# Patient Record
Sex: FEMALE | Race: WHITE | Employment: FULL TIME | URBAN - METROPOLITAN AREA
[De-identification: names, ages, dates, MRNs, and addresses within clinical notes are randomized per-mention and may not be internally consistent; named-entity substitution may affect disease eponyms.]

---

## 2017-01-23 ENCOUNTER — HOSPITAL ENCOUNTER (EMERGENCY)
Age: 22
Discharge: HOME | End: 2017-01-23
Attending: EMERGENCY MEDICINE
Payer: COMMERCIAL

## 2017-01-23 VITALS
TEMPERATURE: 98.1 F | RESPIRATION RATE: 16 BRPM | WEIGHT: 195 LBS | BODY MASS INDEX: 35.88 KG/M2 | OXYGEN SATURATION: 100 % | HEIGHT: 62 IN | SYSTOLIC BLOOD PRESSURE: 117 MMHG | DIASTOLIC BLOOD PRESSURE: 72 MMHG | HEART RATE: 100 BPM

## 2017-01-23 DIAGNOSIS — R11.2 NON-INTRACTABLE VOMITING WITH NAUSEA, UNSPECIFIED VOMITING TYPE: ICD-10-CM

## 2017-01-23 DIAGNOSIS — R10.9 CHRONIC ABDOMINAL PAIN: Primary | ICD-10-CM

## 2017-01-23 DIAGNOSIS — G89.29 CHRONIC ABDOMINAL PAIN: Primary | ICD-10-CM

## 2017-01-23 LAB
ALBUMIN SERPL BCP-MCNC: 4 G/DL (ref 3.4–5)
ALBUMIN/GLOB SERPL: 1.5 {RATIO} (ref 0.8–1.7)
ALP SERPL-CCNC: 82 U/L (ref 45–117)
ALT SERPL-CCNC: 17 U/L (ref 13–56)
ANION GAP BLD CALC-SCNC: 12 MMOL/L (ref 3–18)
APPEARANCE UR: CLEAR
AST SERPL W P-5'-P-CCNC: 11 U/L (ref 15–37)
BACTERIA URNS QL MICRO: ABNORMAL /HPF
BASOPHILS # BLD AUTO: 0 K/UL (ref 0–0.06)
BASOPHILS # BLD: 0 % (ref 0–2)
BILIRUB SERPL-MCNC: 0.4 MG/DL (ref 0.2–1)
BILIRUB UR QL: NEGATIVE
BUN SERPL-MCNC: 9 MG/DL (ref 7–18)
BUN/CREAT SERPL: 10 (ref 12–20)
CALCIUM SERPL-MCNC: 8.4 MG/DL (ref 8.5–10.1)
CHLORIDE SERPL-SCNC: 106 MMOL/L (ref 100–108)
CO2 SERPL-SCNC: 23 MMOL/L (ref 21–32)
COLOR UR: ABNORMAL
CREAT SERPL-MCNC: 0.9 MG/DL (ref 0.6–1.3)
DIFFERENTIAL METHOD BLD: NORMAL
EOSINOPHIL # BLD: 0.1 K/UL (ref 0–0.4)
EOSINOPHIL NFR BLD: 1 % (ref 0–5)
EPITH CASTS URNS QL MICRO: ABNORMAL /LPF (ref 0–5)
ERYTHROCYTE [DISTWIDTH] IN BLOOD BY AUTOMATED COUNT: 13.9 % (ref 11.6–14.5)
GLOBULIN SER CALC-MCNC: 2.7 G/DL (ref 2–4)
GLUCOSE SERPL-MCNC: 122 MG/DL (ref 74–99)
GLUCOSE UR STRIP.AUTO-MCNC: NEGATIVE MG/DL
HCG UR QL: NEGATIVE
HCT VFR BLD AUTO: 39.3 % (ref 35–45)
HGB BLD-MCNC: 12.8 G/DL (ref 12–16)
HGB UR QL STRIP: NEGATIVE
KETONES UR QL STRIP.AUTO: ABNORMAL MG/DL
LEUKOCYTE ESTERASE UR QL STRIP.AUTO: ABNORMAL
LIPASE SERPL-CCNC: 189 U/L (ref 73–393)
LYMPHOCYTES # BLD AUTO: 25 % (ref 21–52)
LYMPHOCYTES # BLD: 2.2 K/UL (ref 0.9–3.6)
MCH RBC QN AUTO: 29.4 PG (ref 24–34)
MCHC RBC AUTO-ENTMCNC: 32.6 G/DL (ref 31–37)
MCV RBC AUTO: 90.3 FL (ref 74–97)
MONOCYTES # BLD: 0.5 K/UL (ref 0.05–1.2)
MONOCYTES NFR BLD AUTO: 6 % (ref 3–10)
NEUTS SEG # BLD: 5.9 K/UL (ref 1.8–8)
NEUTS SEG NFR BLD AUTO: 68 % (ref 40–73)
NITRITE UR QL STRIP.AUTO: NEGATIVE
PH UR STRIP: 5.5 [PH] (ref 5–8)
PLATELET # BLD AUTO: 250 K/UL (ref 135–420)
PMV BLD AUTO: 10.3 FL (ref 9.2–11.8)
POTASSIUM SERPL-SCNC: 3.8 MMOL/L (ref 3.5–5.5)
PROT SERPL-MCNC: 6.7 G/DL (ref 6.4–8.2)
PROT UR STRIP-MCNC: 30 MG/DL
RBC # BLD AUTO: 4.35 M/UL (ref 4.2–5.3)
RBC #/AREA URNS HPF: ABNORMAL /HPF (ref 0–5)
SODIUM SERPL-SCNC: 141 MMOL/L (ref 136–145)
SP GR UR REFRACTOMETRY: >1.03 (ref 1–1.03)
UROBILINOGEN UR QL STRIP.AUTO: 1 EU/DL (ref 0.2–1)
WBC # BLD AUTO: 8.6 K/UL (ref 4.6–13.2)
WBC URNS QL MICRO: ABNORMAL /HPF (ref 0–4)

## 2017-01-23 PROCEDURE — 96375 TX/PRO/DX INJ NEW DRUG ADDON: CPT

## 2017-01-23 PROCEDURE — 96361 HYDRATE IV INFUSION ADD-ON: CPT

## 2017-01-23 PROCEDURE — 74011250636 HC RX REV CODE- 250/636: Performed by: PHYSICIAN ASSISTANT

## 2017-01-23 PROCEDURE — 99282 EMERGENCY DEPT VISIT SF MDM: CPT

## 2017-01-23 PROCEDURE — 80053 COMPREHEN METABOLIC PANEL: CPT

## 2017-01-23 PROCEDURE — 96374 THER/PROPH/DIAG INJ IV PUSH: CPT

## 2017-01-23 PROCEDURE — 81025 URINE PREGNANCY TEST: CPT

## 2017-01-23 PROCEDURE — 85025 COMPLETE CBC W/AUTO DIFF WBC: CPT

## 2017-01-23 PROCEDURE — 83690 ASSAY OF LIPASE: CPT

## 2017-01-23 PROCEDURE — 81001 URINALYSIS AUTO W/SCOPE: CPT

## 2017-01-23 PROCEDURE — 96376 TX/PRO/DX INJ SAME DRUG ADON: CPT

## 2017-01-23 RX ORDER — MORPHINE SULFATE 2 MG/ML
2 INJECTION, SOLUTION INTRAMUSCULAR; INTRAVENOUS ONCE
Status: COMPLETED | OUTPATIENT
Start: 2017-01-23 | End: 2017-01-23

## 2017-01-23 RX ORDER — ONDANSETRON 2 MG/ML
4 INJECTION INTRAMUSCULAR; INTRAVENOUS
Status: COMPLETED | OUTPATIENT
Start: 2017-01-23 | End: 2017-01-23

## 2017-01-23 RX ORDER — DESVENLAFAXINE SUCCINATE 50 MG/1
50 TABLET, EXTENDED RELEASE ORAL
COMMUNITY
End: 2017-05-02

## 2017-01-23 RX ORDER — TRAMADOL HYDROCHLORIDE 50 MG/1
50 TABLET ORAL
Qty: 12 TAB | Refills: 0 | Status: SHIPPED | OUTPATIENT
Start: 2017-01-23 | End: 2017-05-02

## 2017-01-23 RX ADMIN — ONDANSETRON 4 MG: 2 INJECTION INTRAMUSCULAR; INTRAVENOUS at 16:30

## 2017-01-23 RX ADMIN — Medication 2 MG: at 17:27

## 2017-01-23 RX ADMIN — SODIUM CHLORIDE 1000 ML: 900 INJECTION, SOLUTION INTRAVENOUS at 16:27

## 2017-01-23 RX ADMIN — Medication 2 MG: at 16:32

## 2017-01-23 NOTE — ED NOTES
I performed a brief evaluation, including history and physical, of the patient here in triage and I have determined that pt will need further treatment and evaluation from the main side ER physician. I have placed initial orders to help in expediting patients care.      January 23, 2017 at 3:56 PM - Kaitlin Martino PA-C        Visit Vitals    /76 (BP 1 Location: Left arm, BP Patient Position: At rest)    Pulse (!) 119    Temp 98.1 °F (36.7 °C)    Resp 18    Ht 5' 2\" (1.575 m)    Wt 88.5 kg (195 lb)    SpO2 98%    BMI 35.67 kg/m2

## 2017-01-23 NOTE — ED NOTES
Patient states that pain is 8/10. Also c/o nausea, no emesis while in [facility]TA. IV fluids up , medicated as ordered.

## 2017-01-23 NOTE — ED PROVIDER NOTES
[facility]ERGENCY DEPT      22 y.o. female with a PMH of Depression and Chronic abdominal pain presents to the ED c/o diffuse upper abdominal pain since last night. She notes this feels like her typical pain. She vomited x 3 last night, has not vomited yet today. Reports being diagnosed with \"Functional\" disorder of her abdomen by her GI doctor. She is s/p cholecystectomy and appendectomy in the distant past.  She denies any fever, chills, diarrhea, constipation, vaginal discharge/bleeding, urinary symptoms, or other complaints as this time. No current facility-administered medications for this encounter. Current Outpatient Prescriptions   Medication Sig    Desvenlafaxine SR (PRISTIQ) 50 mg tablet Take 50 mg by mouth.  traMADol (ULTRAM) 50 mg tablet Take 1 Tab by mouth every six (6) hours as needed for Pain. Max Daily Amount: 200 mg.    ARIPiprazole (ABILIFY) 5 mg tablet Take 5 mg by mouth daily.  albuterol (PROVENTIL HFA, VENTOLIN HFA, PROAIR HFA) 90 mcg/actuation inhaler Take 2 Puffs by inhalation every four (4) hours as needed for Wheezing or Shortness of Breath (or cough).  ibuprofen (MOTRIN) 800 mg tablet     MEDROXYPROGESTERONE ACETATE (DEPO-PROVERA IM) by IntraMUSCular route. Past Medical History   Diagnosis Date    ADHD (attention deficit hyperactivity disorder)     Asthma     Depression     Migraine     Sinus infection        Past Surgical History   Procedure Laterality Date    Hx heent       ear tubes bilaterally    Hx cholecystectomy      Pr appendectomy         Family History   Problem Relation Age of Onset    Diabetes Maternal Aunt     Hypertension Maternal Grandmother     Diabetes Maternal Grandmother     Hypertension Maternal Grandfather        Social History     Social History    Marital status: SINGLE     Spouse name: N/A    Number of children: N/A    Years of education: N/A     Occupational History    Not on file.      Social History Main Topics    Smoking status: Never Smoker    Smokeless tobacco: Never Used    Alcohol use 0.6 oz/week     1 Cans of beer per week    Drug use: No    Sexual activity: Not Currently     Partners: Male     Birth control/ protection: Condom, Implant     Other Topics Concern    Not on file     Social History Narrative       Allergies   Allergen Reactions    Amoxicillin Rash    Pineapple Unknown (comments)     Facial numbness, swelling       Patient's primary care provider (as noted in EPIC):  Kaity Salinas MD    REVIEW OF SYSTEMS:    Constitutional:  Negative for fever, diaphoresis. HENT:  Negative for congestion. Respiratory:  Negative for cough and shortness of breath. Cardiovascular:  Negative for chest pain and palpitations. Gastrointestinal:+ diffuse upper abdominal pain, nausea/vomiting. Negative for constipation, diarrhea. Genitourinary:  Negative for flank pain. Musculoskeletal:  Negative for back pain. Skin:  Negative for pallor. Neurological:  Negative for weakness. All other systems negative as reviewed. Visit Vitals    /72 (BP 1 Location: Right arm, BP Patient Position: At rest;Sitting)    Pulse 100    Temp 98.1 °F (36.7 °C)    Resp 16    Ht 5' 2\" (1.575 m)    Wt 88.5 kg (195 lb)    SpO2 100%    BMI 35.67 kg/m2       Patient Vitals for the past 12 hrs:   Temp Pulse Resp BP SpO2   01/23/17 1740 - 100 16 117/72 100 %   01/23/17 1553 98.1 °F (36.7 °C) (!) 119 18 130/76 98 %       PHYSICAL EXAM:    CONSTITUTIONAL:  Alert, in no apparent distress;  well developed;  well nourished. HEAD:  Normocephalic, atraumatic. EYES:  EOMI. Non-icteric sclera. Normal conjunctiva. ENTM:  Mouth: mucous membranes moist.  NECK:  Supple  RESPIRATORY:  Chest clear, equal breath sounds, good air movement. CARDIOVASCULAR:  Regular rate and rhythm. No murmurs, rubs, or gallops. GI:  Normal bowel sounds, abdomen soft with diffuse mild TTP across her epigastrium.   No rebound or guarding. BACK:  Non-tender. NEURO:  Moves all four extremities, and grossly normal motor exam.  SKIN:  No rashes;  Normal for age. PSYCH:  Alert and normal affect. DIFFERENTIAL DIAGNOSES/ MEDICAL DECISION MAKING:  Gastritis, gerd, peptic ulcer disease, pancreatitis, gastroenteritis, hepatitis, constipation related pain, diverticulitis, urinary tract infection, obstruction, abdominal wall pain, versus combination of the above and/or numerous other processes/ etiologies.     Abnormal lab results from this emergency department encounter:  Labs Reviewed   URINALYSIS W/ RFLX MICROSCOPIC - Abnormal; Notable for the following:        Result Value    Specific gravity >1.030 (*)     Protein 30 (*)     Ketone TRACE (*)     Leukocyte Esterase TRACE (*)     All other components within normal limits   METABOLIC PANEL, COMPREHENSIVE - Abnormal; Notable for the following:     Glucose 122 (*)     BUN/Creatinine ratio 10 (*)     Calcium 8.4 (*)     AST 11 (*)     All other components within normal limits   URINE MICROSCOPIC ONLY - Abnormal; Notable for the following:     Bacteria 2+ (*)     All other components within normal limits   HCG URINE, QL   LIPASE   CBC WITH AUTOMATED DIFF       Lab values for this patient within approximately the last 12 hours:  Recent Results (from the past 12 hour(s))   LIPASE    Collection Time: 01/23/17  4:15 PM   Result Value Ref Range    Lipase 189 73 - 851 U/L   METABOLIC PANEL, COMPREHENSIVE    Collection Time: 01/23/17  4:15 PM   Result Value Ref Range    Sodium 141 136 - 145 mmol/L    Potassium 3.8 3.5 - 5.5 mmol/L    Chloride 106 100 - 108 mmol/L    CO2 23 21 - 32 mmol/L    Anion gap 12 3.0 - 18 mmol/L    Glucose 122 (H) 74 - 99 mg/dL    BUN 9 7.0 - 18 MG/DL    Creatinine 0.90 0.6 - 1.3 MG/DL    BUN/Creatinine ratio 10 (L) 12 - 20      GFR est AA >60 >60 ml/min/1.73m2    GFR est non-AA >60 >60 ml/min/1.73m2    Calcium 8.4 (L) 8.5 - 10.1 MG/DL    Bilirubin, total 0.4 0.2 - 1.0 MG/DL    ALT 17 13 - 56 U/L    AST 11 (L) 15 - 37 U/L    Alk. phosphatase 82 45 - 117 U/L    Protein, total 6.7 6.4 - 8.2 g/dL    Albumin 4.0 3.4 - 5.0 g/dL    Globulin 2.7 2.0 - 4.0 g/dL    A-G Ratio 1.5 0.8 - 1.7     CBC WITH AUTOMATED DIFF    Collection Time: 01/23/17  4:15 PM   Result Value Ref Range    WBC 8.6 4.6 - 13.2 K/uL    RBC 4.35 4.20 - 5.30 M/uL    HGB 12.8 12.0 - 16.0 g/dL    HCT 39.3 35.0 - 45.0 %    MCV 90.3 74.0 - 97.0 FL    MCH 29.4 24.0 - 34.0 PG    MCHC 32.6 31.0 - 37.0 g/dL    RDW 13.9 11.6 - 14.5 %    PLATELET 155 092 - 265 K/uL    MPV 10.3 9.2 - 11.8 FL    NEUTROPHILS 68 40 - 73 %    LYMPHOCYTES 25 21 - 52 %    MONOCYTES 6 3 - 10 %    EOSINOPHILS 1 0 - 5 %    BASOPHILS 0 0 - 2 %    ABS. NEUTROPHILS 5.9 1.8 - 8.0 K/UL    ABS. LYMPHOCYTES 2.2 0.9 - 3.6 K/UL    ABS. MONOCYTES 0.5 0.05 - 1.2 K/UL    ABS. EOSINOPHILS 0.1 0.0 - 0.4 K/UL    ABS.  BASOPHILS 0.0 0.0 - 0.06 K/UL    DF AUTOMATED     URINALYSIS W/ RFLX MICROSCOPIC    Collection Time: 01/23/17  4:25 PM   Result Value Ref Range    Color DARK YELLOW      Appearance CLEAR      Specific gravity >1.030 (H) 1.005 - 1.030    pH (UA) 5.5 5.0 - 8.0      Protein 30 (A) NEG mg/dL    Glucose NEGATIVE  NEG mg/dL    Ketone TRACE (A) NEG mg/dL    Bilirubin NEGATIVE  NEG      Blood NEGATIVE  NEG      Urobilinogen 1.0 0.2 - 1.0 EU/dL    Nitrites NEGATIVE  NEG      Leukocyte Esterase TRACE (A) NEG     HCG URINE, QL    Collection Time: 01/23/17  4:25 PM   Result Value Ref Range    HCG urine, Ql. NEGATIVE  NEG     URINE MICROSCOPIC ONLY    Collection Time: 01/23/17  4:25 PM   Result Value Ref Range    WBC 0 to 3 0 - 4 /hpf    RBC NONE 0 - 5 /hpf    Epithelial cells 2+ 0 - 5 /lpf    Bacteria 2+ (A) NEG /hpf       Medication(s) ordered for patient during this emergency visit encounter:  Medications   sodium chloride 0.9 % bolus infusion 1,000 mL (0 mL IntraVENous IV Completed 1/23/17 0979)   ondansetron (ZOFRAN) injection 4 mg (4 mg IntraVENous Given 1/23/17 1630) morphine injection 2 mg (2 mg IntraVENous Given 1/23/17 1632)   morphine injection 2 mg (2 mg IntraVENous Given 1/23/17 1727)       IMPRESSION AND MEDICAL DECISION MAKING:  Based upon the patient's presentation with noted HPI and PE, along with the work   up done in the emergency department, I believe that the patient is having abdominal pain likely from her chronic pain. She notes having a \"functional\" gut disorder. Mom states patient just needs pain control and that this is her normal abdominal pain. Pt had percocet at home from her GI doctor but ran out of it. However, I do believe that the patient is stable and can be discharged home with further outpatient evaluation of the abdominal pain by the patient's Gi doctor. Pt looks well. Labs essentially normal.  Vitals improved. Pt given fluids as well. Diagnosis:   1. Chronic abdominal pain    2. Non-intractable vomiting with nausea, unspecified vomiting type      Disposition: Home    Follow-up Information     Follow up With Details Comments [address], [place] In 3 days  [address] [facility]  [phone]      [facility] EMERGENCY DEPT  If symptoms worsen [address] [facility]  [phone]          Discharge Medication List as of 1/23/2017  5:02 PM      START taking these medications    Details   traMADol (ULTRAM) 50 mg tablet Take 1 Tab by mouth every six (6) hours as needed for Pain. Max Daily Amount: 200 mg., Print, Disp-12 Tab, R-0         CONTINUE these medications which have NOT CHANGED    Details   Desvenlafaxine SR (PRISTIQ) 50 mg tablet Take 50 mg by mouth., Historical Med      ARIPiprazole (ABILIFY) 5 mg tablet Take 5 mg by mouth daily. , Historical Med      albuterol (PROVENTIL HFA, VENTOLIN HFA, PROAIR HFA) 90 mcg/actuation inhaler Take 2 Puffs by inhalation every four (4) hours as needed for Wheezing or Shortness of Breath (or cough). , Print, Disp-1 Inhaler, R-1 ibuprofen (MOTRIN) 800 mg tablet Historical Med, R-0      MEDROXYPROGESTERONE ACETATE (DEPO-PROVERA IM) by IntraMUSCular route., Historical Med           [facility] [place]

## 2017-05-02 ENCOUNTER — HOSPITAL ENCOUNTER (EMERGENCY)
Age: 22
Discharge: HOME | End: 2017-05-02
Attending: EMERGENCY MEDICINE
Payer: COMMERCIAL

## 2017-05-02 VITALS
TEMPERATURE: 98.7 F | SYSTOLIC BLOOD PRESSURE: 133 MMHG | WEIGHT: 216 LBS | BODY MASS INDEX: 39.75 KG/M2 | OXYGEN SATURATION: 99 % | HEART RATE: 110 BPM | DIASTOLIC BLOOD PRESSURE: 82 MMHG | HEIGHT: 62 IN

## 2017-05-02 DIAGNOSIS — G43.909 MIGRAINE WITHOUT STATUS MIGRAINOSUS, NOT INTRACTABLE, UNSPECIFIED MIGRAINE TYPE: Primary | ICD-10-CM

## 2017-05-02 DIAGNOSIS — N30.00 ACUTE CYSTITIS WITHOUT HEMATURIA: ICD-10-CM

## 2017-05-02 LAB
APPEARANCE UR: CLEAR
BACTERIA URNS QL MICRO: ABNORMAL /HPF
BILIRUB UR QL: NEGATIVE
COLOR UR: YELLOW
EPITH CASTS URNS QL MICRO: ABNORMAL /LPF (ref 0–5)
GLUCOSE UR STRIP.AUTO-MCNC: NEGATIVE MG/DL
HCG UR QL: NEGATIVE
HGB UR QL STRIP: NEGATIVE
KETONES UR QL STRIP.AUTO: NEGATIVE MG/DL
LEUKOCYTE ESTERASE UR QL STRIP.AUTO: ABNORMAL
NITRITE UR QL STRIP.AUTO: NEGATIVE
PH UR STRIP: 7 [PH] (ref 5–8)
PROT UR STRIP-MCNC: NEGATIVE MG/DL
RBC #/AREA URNS HPF: ABNORMAL /HPF (ref 0–5)
SP GR UR REFRACTOMETRY: 1.02 (ref 1–1.03)
UROBILINOGEN UR QL STRIP.AUTO: 0.2 EU/DL (ref 0.2–1)
WBC URNS QL MICRO: ABNORMAL /HPF (ref 0–4)

## 2017-05-02 PROCEDURE — 96361 HYDRATE IV INFUSION ADD-ON: CPT

## 2017-05-02 PROCEDURE — 96374 THER/PROPH/DIAG INJ IV PUSH: CPT

## 2017-05-02 PROCEDURE — 99283 EMERGENCY DEPT VISIT LOW MDM: CPT

## 2017-05-02 PROCEDURE — 81025 URINE PREGNANCY TEST: CPT

## 2017-05-02 PROCEDURE — 96375 TX/PRO/DX INJ NEW DRUG ADDON: CPT

## 2017-05-02 PROCEDURE — 81001 URINALYSIS AUTO W/SCOPE: CPT

## 2017-05-02 PROCEDURE — 74011250636 HC RX REV CODE- 250/636: Performed by: PHYSICIAN ASSISTANT

## 2017-05-02 RX ORDER — BUTALBITAL AND ACETAMINOPHEN 325; 50 MG/1; MG/1
1 TABLET ORAL
Qty: 15 TAB | Refills: 0 | Status: SHIPPED | OUTPATIENT
Start: 2017-05-02 | End: 2017-05-31

## 2017-05-02 RX ORDER — ONDANSETRON 4 MG/1
4 TABLET, ORALLY DISINTEGRATING ORAL
Qty: 15 TAB | Refills: 0 | Status: SHIPPED | OUTPATIENT
Start: 2017-05-02 | End: 2017-05-11

## 2017-05-02 RX ORDER — METOCLOPRAMIDE HYDROCHLORIDE 5 MG/ML
10 INJECTION INTRAMUSCULAR; INTRAVENOUS
Status: COMPLETED | OUTPATIENT
Start: 2017-05-02 | End: 2017-05-02

## 2017-05-02 RX ORDER — KETOROLAC TROMETHAMINE 30 MG/ML
30 INJECTION, SOLUTION INTRAMUSCULAR; INTRAVENOUS
Status: COMPLETED | OUTPATIENT
Start: 2017-05-02 | End: 2017-05-02

## 2017-05-02 RX ORDER — DIPHENHYDRAMINE HYDROCHLORIDE 50 MG/ML
25 INJECTION, SOLUTION INTRAMUSCULAR; INTRAVENOUS
Status: COMPLETED | OUTPATIENT
Start: 2017-05-02 | End: 2017-05-02

## 2017-05-02 RX ORDER — LITHIUM CARBONATE 300 MG/1
600 CAPSULE ORAL 2 TIMES DAILY WITH MEALS
COMMUNITY
End: 2018-04-06

## 2017-05-02 RX ORDER — NITROFURANTOIN 25; 75 MG/1; MG/1
100 CAPSULE ORAL 2 TIMES DAILY
Qty: 6 CAP | Refills: 0 | Status: SHIPPED | OUTPATIENT
Start: 2017-05-02 | End: 2017-05-05

## 2017-05-02 RX ADMIN — SODIUM CHLORIDE 1000 ML: 900 INJECTION, SOLUTION INTRAVENOUS at 16:30

## 2017-05-02 RX ADMIN — METOCLOPRAMIDE 10 MG: 5 INJECTION, SOLUTION INTRAMUSCULAR; INTRAVENOUS at 16:30

## 2017-05-02 RX ADMIN — DIPHENHYDRAMINE HYDROCHLORIDE 25 MG: 50 INJECTION, SOLUTION INTRAMUSCULAR; INTRAVENOUS at 16:30

## 2017-05-02 RX ADMIN — KETOROLAC TROMETHAMINE 30 MG: 30 INJECTION, SOLUTION INTRAMUSCULAR at 16:29

## 2017-05-02 NOTE — ED TRIAGE NOTES
Patient c/o migraine x 1 week, patient took tylenol, patient medication for migraine she cant take due to other medication that interactions.

## 2017-05-02 NOTE — ED NOTES
Chandana Washington is a 25 y.o. female that was discharged in improved condition. The patients diagnosis, condition and treatment were explained to  patient and aftercare instructions were given. The patient verbalized understanding. Patient armband removed and shredded.  IV removed

## 2017-05-02 NOTE — ED PROVIDER NOTES
Patient is a 25 y.o. female presenting with headaches. The history is provided by the patient and a parent. Headache    This is a new problem. Episode onset: 1 week ago. Episode frequency: Intermittently - comes and goes. The problem has not changed since onset. Associated with: Photophobia, neck pain, nausea with a couple episodes non-bloody, non-bilious vomiting. All consistent with her previous migraines. The pain is located in the left unilateral and right unilateral (Moves sides) region. The pain is at a severity of 9/10. Associated symptoms include nausea and vomiting. Pertinent negatives include no anorexia, no fever, no malaise/fatigue, no chest pressure, no near-syncope, no orthopnea, no palpitations, no syncope, no shortness of breath, no weakness, no tingling, no dizziness and no visual change. She has tried NSAIDs and acetaminophen (Excedrin) for the symptoms. The treatment provided mild relief. Pt has hx of migraines, sees neurology for these, and this is similar to previous. She was previously rx with sumatriptan which worked, however she can no longer use this because she was just changed to lithium from trulicity/abilify for depression, which interacts with sumatriptan. Pt also notes urinary frequency. No other complaints at this time.      Past Medical History:   Diagnosis Date    ADHD (attention deficit hyperactivity disorder)     Asthma     Depression     Migraine     Sinus infection        Past Surgical History:   Procedure Laterality Date    APPENDECTOMY      HX CHOLECYSTECTOMY      HX HEENT      ear tubes bilaterally         Family History:   Problem Relation Age of Onset    Diabetes Maternal Aunt     Hypertension Maternal Grandmother     Diabetes Maternal Grandmother     Hypertension Maternal Grandfather        Social History     Social History    Marital status: SINGLE     Spouse name: N/A    Number of children: N/A    Years of education: N/A     Occupational History    Not on file. Social History Main Topics    Smoking status: Never Smoker    Smokeless tobacco: Never Used    Alcohol use 0.6 oz/week     1 Cans of beer per week    Drug use: No    Sexual activity: Not Currently     Partners: Male     Birth control/ protection: Condom, Implant     Other Topics Concern    Not on file     Social History Narrative         ALLERGIES: Amoxicillin and Pineapple    Review of Systems   Constitutional: Negative for chills, diaphoresis, fever and malaise/fatigue. HENT: Negative for ear pain, rhinorrhea and sore throat. Eyes: Positive for photophobia. Negative for pain and redness. Respiratory: Negative for cough and shortness of breath. Cardiovascular: Negative for chest pain, palpitations, orthopnea, leg swelling, syncope and near-syncope. Gastrointestinal: Positive for nausea and vomiting. Negative for abdominal distention, abdominal pain, anal bleeding, anorexia, blood in stool, constipation, diarrhea and rectal pain. Genitourinary: Positive for frequency. Negative for dysuria, flank pain, hematuria, pelvic pain, vaginal bleeding, vaginal discharge and vaginal pain. Musculoskeletal: Positive for neck pain. Negative for arthralgias, back pain, gait problem, joint swelling, myalgias and neck stiffness. Skin: Negative. Neurological: Positive for headaches. Negative for dizziness, tingling, tremors, seizures, syncope, facial asymmetry, speech difficulty, weakness, light-headedness and numbness. Psychiatric/Behavioral: Negative. Vitals:    05/02/17 1536   BP: 133/82   Pulse: (!) 110   Temp: 98.7 °F (37.1 °C)   SpO2: 99%   Weight: 98 kg (216 lb)   Height: 5' 2\" (1.575 m)            Physical Exam   Constitutional: She is oriented to person, place, and time. She appears well-developed and well-nourished. No distress. HENT:   Head: Normocephalic and atraumatic.    Right Ear: Tympanic membrane, external ear and ear canal normal.   Left Ear: Tympanic membrane, external ear and ear canal normal.   Nose: Nose normal.   Mouth/Throat: Oropharynx is clear and moist and mucous membranes are normal. No oropharyngeal exudate. Eyes: Conjunctivae and EOM are normal. Pupils are equal, round, and reactive to light. Right eye exhibits no discharge. Left eye exhibits no discharge. Neck: Normal range of motion and full passive range of motion without pain. Neck supple. No spinous process tenderness and no muscular tenderness present. No Brudzinski's sign and no Kernig's sign noted. Cardiovascular: Normal rate, regular rhythm, normal heart sounds and intact distal pulses. Exam reveals no gallop and no friction rub. No murmur heard. Pulmonary/Chest: Effort normal and breath sounds normal. No respiratory distress. She has no wheezes. She has no rales. Abdominal: Soft. Bowel sounds are normal. She exhibits no distension. There is no tenderness. There is no rebound and no guarding. Musculoskeletal: Normal range of motion. She exhibits no edema. Neurological: She is alert and oriented to person, place, and time. She has normal strength. She is not disoriented. She displays no atrophy, no tremor and normal reflexes. No cranial nerve deficit or sensory deficit. She exhibits normal muscle tone. She displays no seizure activity. Coordination and gait normal. GCS eye subscore is 4. GCS verbal subscore is 5. GCS motor subscore is 6. Sensation intact, 5/5 strength in all extremities, 2+ reflexes in all extremities, stable gait, normal finger to nose bilaterally. Skin: Skin is warm and dry. She is not diaphoretic. Psychiatric: She has a normal mood and affect. Her behavior is normal. Judgment and thought content normal.   Nursing note and vitals reviewed.        MDM  Number of Diagnoses or Management Options  Acute cystitis without hematuria: new and requires workup  Migraine without status migrainosus, not intractable, unspecified migraine type: new and requires workup  Diagnosis management comments: DDx: HA, migraine, sinusitis, meningitis, SAH, head injury/concussion, contusion, TIA, stroke, aneurism, change in visual acuity, vertigo, eye emergency, dental pain, viral illness, temporal/Giant cell arteritis, scalp/skin etiology, malignancy    ED COURSE:    The patient's migraine headache was markedly improved with the noted non-narcotic medications. Phrenilin and zofran do not interact with lithium, will give short rx until pt can see neurology. UA with urinary freq - will rx short course of macrobid. Addy Babin PA-C 5:29 PM     IMPRESSION AND MEDICAL DECISION MAKING:  Based upon the patient's presentation with noted HPI and PE, along with the work up done in the emergency department, I believe that the patient is having the patient's typical migraine presentation. This patient presents with a headache that is similar to previous headaches. No atypical features by history. The patient does not appear toxic and is neurologically normal on exam.  No evidence for SAH, meningitis, tumor, or other malignant cause today based on evaluation today. Given the history and physical exam, I believe symptomatic treatment is appropriate at this time. The patient understands the need for follow-up and the symptoms and signs that would mandate immediate return to the ED for re-evaluation. Follow-up Activity limitations:  None  Condition on Discharge:  Stable     DIAGNOSIS:  1. Migraine headache in patient with history of migraines. 2. UTI    SPECIFIC PATIENT INSTRUCTIONS FROM THE PHYSICIAN WHO TREATED YOU IN THE ER TODAY:  1. Return if any concerns or worsening of condition(s)  2. Finish antibiotics as prescribed. Take phrenilin as prescribed for headache. 3.  IF zofran prescribed for nausea and vomiting, then take them as prescribed for nausea and vomiting. 4.  FOLLOW UP APPOINTMENT:  Your primary doctor in 1-2 days.      Pt results have been reviewed with them. They have been counseled regarding diagnosis, treatment, and plan. Pt verbally conveys understanding and agreement of the signs, symptoms, diagnosis, treatment and prognosis and additionally agrees to follow up as discussed. Pt also agrees with the care-plan and conveys that all of their questions have been answered. I have also provided discharge instructions for them that include: educational information regarding their diagnosis and treatment, and list of reasons why they would want to return to the ED prior to their follow-up appointment, should their condition change. Kyrie Melgar PA-C 5:29 PM          Amount and/or Complexity of Data Reviewed  Clinical lab tests: ordered and reviewed  Tests in the medicine section of CPT®: ordered and reviewed  Discussion of test results with the performing providers: yes  Decide to obtain previous medical records or to obtain history from someone other than the patient: yes  Obtain history from someone other than the patient: yes  Review and summarize past medical records: yes  Discuss the patient with other providers: yes    Risk of Complications, Morbidity, and/or Mortality  Presenting problems: moderate  Diagnostic procedures: moderate  Management options: moderate    Patient Progress  Patient progress: stable    ED Course       Procedures    Labs Reviewed   URINALYSIS W/ RFLX MICROSCOPIC - Abnormal; Notable for the following:        Result Value    Leukocyte Esterase SMALL (*)     All other components within normal limits   URINE MICROSCOPIC ONLY - Abnormal; Notable for the following:     Bacteria FEW (*)     All other components within normal limits   HCG URINE, QL     Diagnosis:   1. Migraine without status migrainosus, not intractable, unspecified migraine type    2.  Acute cystitis without hematuria          Disposition: home    Follow-up Information     Follow up With Details Comments [facility] EM[facility] DE[facility]  As needed, If symptoms worsen [address] [facility] [address] [facility]    Rei Harrell MD In 2 days  [address]      Juan Jon MD In 1 week  [address]  [phone]            Patient's Medications   Start Taking    BUTALBITAL-ACETAMINOPHEN (PHRENILIN)  MG TABLET    Take 1 Tab by mouth every four (4) hours as needed. Maximum 6 tabs daily. NITROFURANTOIN, MACROCRYSTAL-MONOHYDRATE, (MACROBID) 100 MG CAPSULE    Take 1 Cap by mouth two (2) times a day for 3 days. ONDANSETRON (ZOFRAN ODT) 4 MG DISINTEGRATING TABLET    Take 1 Tab by mouth every eight (8) hours as needed for Nausea. Continue Taking    ALBUTEROL (PROVENTIL HFA, VENTOLIN HFA, PROAIR HFA) 90 MCG/ACTUATION INHALER    Take 2 Puffs by inhalation every four (4) hours as needed for Wheezing or Shortness of Breath (or cough). LITHIUM CARBONATE 300 MG CAPSULE    Take 600 mg by mouth two (2) times daily (with meals). MEDROXYPROGESTERONE ACETATE (DEPO-PROVERA IM)    by IntraMUSCular route. These Medications have changed    No medications on file   Stop Taking    ARIPIPRAZOLE (ABILIFY) 5 MG TABLET    Take 5 mg by mouth daily. DESVENLAFAXINE SR (PRISTIQ) 50 MG TABLET    Take 50 mg by mouth. IBUPROFEN (MOTRIN) 800 MG TABLET        TRAMADOL (ULTRAM) 50 MG TABLET    Take 1 Tab by mouth every six (6) hours as needed for Pain. Max Daily Amount: 200 mg.

## 2017-05-11 ENCOUNTER — APPOINTMENT (OUTPATIENT)
Dept: CT IMAGING | Age: 22
End: 2017-05-11
Attending: EMERGENCY MEDICINE
Payer: COMMERCIAL

## 2017-05-11 ENCOUNTER — HOSPITAL ENCOUNTER (EMERGENCY)
Age: 22
Discharge: HOME | End: 2017-05-11
Attending: EMERGENCY MEDICINE
Payer: COMMERCIAL

## 2017-05-11 VITALS
DIASTOLIC BLOOD PRESSURE: 73 MMHG | WEIGHT: 219 LBS | BODY MASS INDEX: 40.3 KG/M2 | SYSTOLIC BLOOD PRESSURE: 133 MMHG | HEIGHT: 62 IN | RESPIRATION RATE: 16 BRPM | OXYGEN SATURATION: 100 % | TEMPERATURE: 98.3 F | HEART RATE: 87 BPM

## 2017-05-11 DIAGNOSIS — R10.9 ABDOMINAL PAIN, UNSPECIFIED LOCATION: Primary | ICD-10-CM

## 2017-05-11 LAB
ALBUMIN SERPL BCP-MCNC: 3.9 G/DL (ref 3.4–5)
ALBUMIN/GLOB SERPL: 1.3 {RATIO} (ref 0.8–1.7)
ALP SERPL-CCNC: 91 U/L (ref 45–117)
ALT SERPL-CCNC: 43 U/L (ref 13–56)
ANION GAP BLD CALC-SCNC: 11 MMOL/L (ref 3–18)
APPEARANCE UR: CLEAR
AST SERPL W P-5'-P-CCNC: 21 U/L (ref 15–37)
BACTERIA URNS QL MICRO: NEGATIVE /HPF
BASOPHILS # BLD AUTO: 0 K/UL (ref 0–0.06)
BASOPHILS # BLD: 0 % (ref 0–2)
BILIRUB DIRECT SERPL-MCNC: 0.1 MG/DL (ref 0–0.2)
BILIRUB SERPL-MCNC: 0.3 MG/DL (ref 0.2–1)
BILIRUB UR QL: NEGATIVE
BUN SERPL-MCNC: 12 MG/DL (ref 7–18)
BUN/CREAT SERPL: 14 (ref 12–20)
CALCIUM SERPL-MCNC: 9.4 MG/DL (ref 8.5–10.1)
CHLORIDE SERPL-SCNC: 102 MMOL/L (ref 100–108)
CO2 SERPL-SCNC: 28 MMOL/L (ref 21–32)
COLOR UR: YELLOW
CREAT SERPL-MCNC: 0.84 MG/DL (ref 0.6–1.3)
DIFFERENTIAL METHOD BLD: ABNORMAL
EOSINOPHIL # BLD: 0.1 K/UL (ref 0–0.4)
EOSINOPHIL NFR BLD: 0 % (ref 0–5)
EPITH CASTS URNS QL MICRO: NORMAL /LPF (ref 0–5)
ERYTHROCYTE [DISTWIDTH] IN BLOOD BY AUTOMATED COUNT: 13.1 % (ref 11.6–14.5)
GLOBULIN SER CALC-MCNC: 3.1 G/DL (ref 2–4)
GLUCOSE SERPL-MCNC: 99 MG/DL (ref 74–99)
GLUCOSE UR STRIP.AUTO-MCNC: NEGATIVE MG/DL
HCG UR QL: NEGATIVE
HCT VFR BLD AUTO: 40.5 % (ref 35–45)
HGB BLD-MCNC: 13 G/DL (ref 12–16)
HGB UR QL STRIP: NEGATIVE
KETONES UR QL STRIP.AUTO: ABNORMAL MG/DL
LEUKOCYTE ESTERASE UR QL STRIP.AUTO: ABNORMAL
LIPASE SERPL-CCNC: 151 U/L (ref 73–393)
LYMPHOCYTES # BLD AUTO: 19 % (ref 21–52)
LYMPHOCYTES # BLD: 3.4 K/UL (ref 0.9–3.6)
MCH RBC QN AUTO: 29 PG (ref 24–34)
MCHC RBC AUTO-ENTMCNC: 32.1 G/DL (ref 31–37)
MCV RBC AUTO: 90.4 FL (ref 74–97)
MONOCYTES # BLD: 1.1 K/UL (ref 0.05–1.2)
MONOCYTES NFR BLD AUTO: 6 % (ref 3–10)
NEUTS SEG # BLD: 13.5 K/UL (ref 1.8–8)
NEUTS SEG NFR BLD AUTO: 75 % (ref 40–73)
NITRITE UR QL STRIP.AUTO: NEGATIVE
PH UR STRIP: 6.5 [PH] (ref 5–8)
PLATELET # BLD AUTO: 318 K/UL (ref 135–420)
PMV BLD AUTO: 10.1 FL (ref 9.2–11.8)
POTASSIUM SERPL-SCNC: 3.8 MMOL/L (ref 3.5–5.5)
PROT SERPL-MCNC: 7 G/DL (ref 6.4–8.2)
PROT UR STRIP-MCNC: NEGATIVE MG/DL
RBC # BLD AUTO: 4.48 M/UL (ref 4.2–5.3)
RBC #/AREA URNS HPF: NORMAL /HPF (ref 0–5)
SODIUM SERPL-SCNC: 141 MMOL/L (ref 136–145)
SP GR UR REFRACTOMETRY: 1.02 (ref 1–1.03)
UROBILINOGEN UR QL STRIP.AUTO: 0.2 EU/DL (ref 0.2–1)
WBC # BLD AUTO: 18 K/UL (ref 4.6–13.2)
WBC URNS QL MICRO: NORMAL /HPF (ref 0–4)

## 2017-05-11 PROCEDURE — 80076 HEPATIC FUNCTION PANEL: CPT | Performed by: EMERGENCY MEDICINE

## 2017-05-11 PROCEDURE — 85025 COMPLETE CBC W/AUTO DIFF WBC: CPT | Performed by: EMERGENCY MEDICINE

## 2017-05-11 PROCEDURE — 81001 URINALYSIS AUTO W/SCOPE: CPT | Performed by: EMERGENCY MEDICINE

## 2017-05-11 PROCEDURE — 83690 ASSAY OF LIPASE: CPT | Performed by: EMERGENCY MEDICINE

## 2017-05-11 PROCEDURE — 94762 N-INVAS EAR/PLS OXIMTRY CONT: CPT

## 2017-05-11 PROCEDURE — 81025 URINE PREGNANCY TEST: CPT | Performed by: EMERGENCY MEDICINE

## 2017-05-11 PROCEDURE — 96361 HYDRATE IV INFUSION ADD-ON: CPT

## 2017-05-11 PROCEDURE — 99283 EMERGENCY DEPT VISIT LOW MDM: CPT

## 2017-05-11 PROCEDURE — 80048 BASIC METABOLIC PNL TOTAL CA: CPT | Performed by: EMERGENCY MEDICINE

## 2017-05-11 PROCEDURE — 74177 CT ABD & PELVIS W/CONTRAST: CPT

## 2017-05-11 PROCEDURE — 74011250636 HC RX REV CODE- 250/636: Performed by: EMERGENCY MEDICINE

## 2017-05-11 PROCEDURE — 74011636320 HC RX REV CODE- 636/320: Performed by: EMERGENCY MEDICINE

## 2017-05-11 PROCEDURE — 96375 TX/PRO/DX INJ NEW DRUG ADDON: CPT

## 2017-05-11 PROCEDURE — 96374 THER/PROPH/DIAG INJ IV PUSH: CPT

## 2017-05-11 RX ORDER — ONDANSETRON 4 MG/1
4 TABLET, ORALLY DISINTEGRATING ORAL
Qty: 14 TAB | Refills: 0 | Status: SHIPPED | OUTPATIENT
Start: 2017-05-11 | End: 2017-08-20

## 2017-05-11 RX ORDER — HYDROMORPHONE HYDROCHLORIDE 1 MG/ML
1 INJECTION, SOLUTION INTRAMUSCULAR; INTRAVENOUS; SUBCUTANEOUS
Status: COMPLETED | OUTPATIENT
Start: 2017-05-11 | End: 2017-05-11

## 2017-05-11 RX ORDER — ONDANSETRON 2 MG/ML
4 INJECTION INTRAMUSCULAR; INTRAVENOUS
Status: COMPLETED | OUTPATIENT
Start: 2017-05-11 | End: 2017-05-11

## 2017-05-11 RX ORDER — HYDROCODONE BITARTRATE AND ACETAMINOPHEN 5; 325 MG/1; MG/1
1 TABLET ORAL
Qty: 14 TAB | Refills: 0 | Status: SHIPPED | OUTPATIENT
Start: 2017-05-11 | End: 2017-05-31

## 2017-05-11 RX ADMIN — SODIUM CHLORIDE 1000 ML: 900 INJECTION, SOLUTION INTRAVENOUS at 18:43

## 2017-05-11 RX ADMIN — ONDANSETRON 4 MG: 2 INJECTION INTRAMUSCULAR; INTRAVENOUS at 19:41

## 2017-05-11 RX ADMIN — HYDROMORPHONE HYDROCHLORIDE 1 MG: 1 INJECTION, SOLUTION INTRAMUSCULAR; INTRAVENOUS; SUBCUTANEOUS at 19:41

## 2017-05-11 RX ADMIN — IOPAMIDOL 100 ML: 612 INJECTION, SOLUTION INTRAVENOUS at 19:28

## 2017-05-11 RX ADMIN — DIATRIZOATE MEGLUMINE AND DIATRIZOATE SODIUM 30 ML: 660; 100 LIQUID ORAL; RECTAL at 19:41

## 2017-05-11 NOTE — ED PROVIDER NOTES
HPI Comments: Pt c/o mid/rt sided abd pain. X 3 days, w n/v. Says h/o same occas q few months, but worse today. Sees dr Reggie Panda, says worse now. Prior appendectomy. No pain meds or nausea meds at home. No fever. Patient is a 25 y.o. female presenting with abdominal pain, nausea, and vomiting. Abdominal Pain    Associated symptoms include nausea and vomiting. Pertinent negatives include no fever, no chest pain and no back pain. Nausea    Associated symptoms include abdominal pain. Pertinent negatives include no fever and no cough. Vomiting    Associated symptoms include abdominal pain. Pertinent negatives include no fever and no cough. Past Medical History:   Diagnosis Date    ADHD (attention deficit hyperactivity disorder)     Asthma     Depression     Migraine     Sinus infection        Past Surgical History:   Procedure Laterality Date    APPENDECTOMY      HX CHOLECYSTECTOMY      HX HEENT      ear tubes bilaterally         Family History:   Problem Relation Age of Onset    Diabetes Maternal Aunt     Hypertension Maternal Grandmother     Diabetes Maternal Grandmother     Hypertension Maternal Grandfather        Social History     Social History    Marital status: SINGLE     Spouse name: N/A    Number of children: N/A    Years of education: N/A     Occupational History    Not on file. Social History Main Topics    Smoking status: Never Smoker    Smokeless tobacco: Never Used    Alcohol use 0.6 oz/week     1 Cans of beer per week    Drug use: No    Sexual activity: Not Currently     Partners: Male     Birth control/ protection: Condom, Implant     Other Topics Concern    Not on file     Social History Narrative         ALLERGIES: Amoxicillin and Pineapple    Review of Systems   Constitutional: Negative for fever. HENT: Negative for congestion. Respiratory: Negative for cough and shortness of breath. Cardiovascular: Negative for chest pain.    Gastrointestinal: Positive for abdominal pain, nausea and vomiting. Musculoskeletal: Negative for back pain. Skin: Negative for rash. Neurological: Negative for light-headedness. All other systems reviewed and are negative. Vitals:    05/11/17 1722 05/11/17 2030 05/11/17 2200   BP: 129/87  133/73   Pulse: 98 85 87   Resp: 19 16 16   Temp: 98.3 °F (36.8 °C)     SpO2: 97% 100% 100%   Weight: 99.3 kg (219 lb)     Height: 5' 2\" (1.575 m)              Physical Exam   Constitutional: She is oriented to person, place, and time. She appears well-developed. HENT:   Head: Normocephalic. Mouth/Throat: Oropharynx is clear and moist.   Eyes: Pupils are equal, round, and reactive to light. Neck: Normal range of motion. Cardiovascular: Normal rate and normal heart sounds. No murmur heard. Pulmonary/Chest: Effort normal. She has no wheezes. She has no rales. Abdominal: Soft. There is tenderness (+ mid abd ttp, mod/severe). Musculoskeletal: Normal range of motion. She exhibits no edema. Neurological: She is alert and oriented to person, place, and time. Skin: Skin is warm and dry. Nursing note and vitals reviewed. [facility]  ED Course       Procedures      Vitals:  No data found. Medications ordered:   Medications   sodium chloride 0.9 % bolus infusion 1,000 mL (0 mL IntraVENous IV Completed 5/11/17 2217)   diatrizoate meglumine-d.sodium (MD-GASTROVIEW,GASTROGRAFIN) 66-10 % contrast solution 30 mL (30 mL Oral Given 5/11/17 1941)   ondansetron (ZOFRAN) injection 4 mg (4 mg IntraVENous Given 5/11/17 1941)   HYDROmorphone (PF) (DILAUDID) injection 1 mg (1 mg IntraVENous Given 5/11/17 1941)   iopamidol (ISOVUE 300) 61 % contrast injection  mL (100 mL IntraVENous Given 5/11/17 1928)         Lab findings:  No results found for this or any previous visit (from the past 12 hour(s)).         X-Ray, CT or other radiology findings or impressions:  CT ABD PELV W CONT   Final Result          Progress notes, Consult notes or additional Procedure notes:   Long discussion with patient regarding risks/benefits of cat scan. Risks discussed including risk of cancer from radiation/fibrosis. Pt verbalizes understanding of risks and strongly wants ct, pt is very concerned regarding the pain. 10:26 PM pt remains pain free throughout stay after initial tx.  abd soft and non-tender. Pt declines pelvic, says no pelvic pain or vag dc/bleeding. No emc, stable for dc and close f/u . Detailed return inst given. Disposition:  Diagnosis:   1. Abdominal pain, unspecified location        Disposition: home    Follow-up Information     Follow up With Details Comments Contact Info    Huong Mcelroy MD Schedule an appointment as soon as possible for a visit in 1 day  [address] [facility]      Tomeka Huff MD Schedule an appointment as soon as possible for a visit in 1 day  [address] [facility] [address]  [phone]             Discharge Medication List as of 5/11/2017 10:27 PM      START taking these medications    Details   HYDROcodone-acetaminophen (NORCO) 5-325 mg per tablet Take 1 Tab by mouth every six (6) hours as needed for Pain. Max Daily Amount: 4 Tabs., Print, Disp-14 Tab, R-0         CONTINUE these medications which have CHANGED    Details   ondansetron (ZOFRAN ODT) 4 mg disintegrating tablet Take 1 Tab by mouth every eight (8) hours as needed for Nausea. , Print, Disp-14 Tab, R-0         CONTINUE these medications which have NOT CHANGED    Details   lithium carbonate 300 mg capsule Take 600 mg by mouth two (2) times daily (with meals). , Historical Med      butalbital-acetaminophen (PHRENILIN)  mg tablet Take 1 Tab by mouth every four (4) hours as needed. Maximum 6 tabs daily. , Print, Disp-15 Tab, R-0      MEDROXYPROGESTERONE ACETATE (DEPO-PROVERA IM) by IntraMUSCular route., Historical Med      albuterol (PROVENTIL HFA, VENTOLIN HFA, PROAIR HFA) 90 mcg/actuation inhaler Take 2 Puffs by inhalation every four (4) hours as needed for Wheezing or Shortness of Breath (or cough). , Print, Disp-1 Inhaler, R-1

## 2017-05-12 NOTE — ED NOTES
Melissa Delgado is a 25 y.o. female that was discharged in stable. Pt was accompanied by father. Pt is not driving. The patients diagnosis, condition and treatment were explained to  patient and aftercare instructions were given. The patient verbalized understanding. Patient armband removed and shredded.

## 2017-05-12 NOTE — DISCHARGE INSTRUCTIONS
Return for any fever, pain, shortness of breath, vomiting, decreased fluid intake, weakness, numbness, dizziness, or any change or concerns. Abdominal Pain: Care Instructions  Your Care Instructions    Abdominal pain has many possible causes. Some aren't serious and get better on their own in a few days. Others need more testing and treatment. If your pain continues or gets worse, you need to be rechecked and may need more tests to find out what is wrong. You may need surgery to correct the problem. Don't ignore new symptoms, such as fever, nausea and vomiting, urination problems, pain that gets worse, and dizziness. These may be signs of a more serious problem. Your doctor may have recommended a follow-up visit in the next 8 to 12 hours. If you are not getting better, you may need more tests or treatment. The doctor has checked you carefully, but problems can develop later. If you notice any problems or new symptoms, get medical treatment right away. Follow-up care is a key part of your treatment and safety. Be sure to make and go to all appointments, and call your doctor if you are having problems. It's also a good idea to know your test results and keep a list of the medicines you take. How can you care for yourself at home? · Rest until you feel better. · To prevent dehydration, drink plenty of fluids, enough so that your urine is light yellow or clear like water. Choose water and other caffeine-free clear liquids until you feel better. If you have kidney, heart, or liver disease and have to limit fluids, talk with your doctor before you increase the amount of fluids you drink. · If your stomach is upset, eat mild foods, such as rice, dry toast or crackers, bananas, and applesauce. Try eating several small meals instead of two or three large ones. · Wait until 48 hours after all symptoms have gone away before you have spicy foods, alcohol, and drinks that contain caffeine.   · Do not eat foods that are high in fat. · Avoid anti-inflammatory medicines such as aspirin, ibuprofen (Advil, Motrin), and naproxen (Aleve). These can cause stomach upset. Talk to your doctor if you take daily aspirin for another health problem. When should you call for help? Call 911 anytime you think you may need emergency care. For example, call if:  · You passed out (lost consciousness). · You pass maroon or very bloody stools. · You vomit blood or what looks like coffee grounds. · You have new, severe belly pain. Call your doctor now or seek immediate medical care if:  · Your pain gets worse, especially if it becomes focused in one area of your belly. · You have a new or higher fever. · Your stools are black and look like tar, or they have streaks of blood. · You have unexpected vaginal bleeding. · You have symptoms of a urinary tract infection. These may include:  ¨ Pain when you urinate. ¨ Urinating more often than usual.  ¨ Blood in your urine. · You are dizzy or lightheaded, or you feel like you may faint. Watch closely for changes in your health, and be sure to contact your doctor if:  · You are not getting better after 1 day (24 hours). Where can you learn more? Go to http://ruba-silvia.info/. Enter U829 in the search box to learn more about \"Abdominal Pain: Care Instructions. \"  Current as of: May 27, 2016  Content Version: 11.2  © 9761-8227 [email or web address]. Care instructions adapted under license by [employer] (which disclaims liability or warranty for this information). If you have questions about a medical condition or this instruction, always ask your healthcare professional. [place] [address] any warranty or liability for your use of this information.

## 2017-05-14 ENCOUNTER — HOSPITAL ENCOUNTER (EMERGENCY)
Age: 22
Discharge: HOME | End: 2017-05-14
Attending: EMERGENCY MEDICINE
Payer: COMMERCIAL

## 2017-05-14 VITALS
WEIGHT: 219 LBS | BODY MASS INDEX: 40.3 KG/M2 | DIASTOLIC BLOOD PRESSURE: 83 MMHG | TEMPERATURE: 98.8 F | SYSTOLIC BLOOD PRESSURE: 128 MMHG | HEART RATE: 103 BPM | RESPIRATION RATE: 16 BRPM | HEIGHT: 62 IN | OXYGEN SATURATION: 94 %

## 2017-05-14 DIAGNOSIS — R10.13 ABDOMINAL PAIN, EPIGASTRIC: Primary | ICD-10-CM

## 2017-05-14 LAB
ALBUMIN SERPL BCP-MCNC: 3.8 G/DL (ref 3.4–5)
ALBUMIN/GLOB SERPL: 1 {RATIO} (ref 0.8–1.7)
ALP SERPL-CCNC: 91 U/L (ref 45–117)
ALT SERPL-CCNC: 28 U/L (ref 13–56)
ANION GAP BLD CALC-SCNC: 10 MMOL/L (ref 3–18)
APPEARANCE UR: CLEAR
AST SERPL W P-5'-P-CCNC: 10 U/L (ref 15–37)
BACTERIA URNS QL MICRO: ABNORMAL /HPF
BASOPHILS # BLD AUTO: 0.1 K/UL (ref 0–0.06)
BASOPHILS # BLD: 0 % (ref 0–2)
BILIRUB SERPL-MCNC: 0.3 MG/DL (ref 0.2–1)
BILIRUB UR QL: NEGATIVE
BUN SERPL-MCNC: 9 MG/DL (ref 7–18)
BUN/CREAT SERPL: 11 (ref 12–20)
CALCIUM SERPL-MCNC: 9.2 MG/DL (ref 8.5–10.1)
CHLORIDE SERPL-SCNC: 105 MMOL/L (ref 100–108)
CO2 SERPL-SCNC: 25 MMOL/L (ref 21–32)
COLOR UR: YELLOW
CREAT SERPL-MCNC: 0.82 MG/DL (ref 0.6–1.3)
DIFFERENTIAL METHOD BLD: ABNORMAL
EOSINOPHIL # BLD: 0.1 K/UL (ref 0–0.4)
EOSINOPHIL NFR BLD: 1 % (ref 0–5)
EPITH CASTS URNS QL MICRO: ABNORMAL /LPF (ref 0–5)
ERYTHROCYTE [DISTWIDTH] IN BLOOD BY AUTOMATED COUNT: 13.3 % (ref 11.6–14.5)
GLOBULIN SER CALC-MCNC: 3.7 G/DL (ref 2–4)
GLUCOSE SERPL-MCNC: 73 MG/DL (ref 74–99)
GLUCOSE UR STRIP.AUTO-MCNC: NEGATIVE MG/DL
HCT VFR BLD AUTO: 42.1 % (ref 35–45)
HGB BLD-MCNC: 13.5 G/DL (ref 12–16)
HGB UR QL STRIP: NEGATIVE
KETONES UR QL STRIP.AUTO: NEGATIVE MG/DL
LEUKOCYTE ESTERASE UR QL STRIP.AUTO: ABNORMAL
LIPASE SERPL-CCNC: 135 U/L (ref 73–393)
LYMPHOCYTES # BLD AUTO: 20 % (ref 21–52)
LYMPHOCYTES # BLD: 3.2 K/UL (ref 0.9–3.6)
MCH RBC QN AUTO: 29 PG (ref 24–34)
MCHC RBC AUTO-ENTMCNC: 32.1 G/DL (ref 31–37)
MCV RBC AUTO: 90.5 FL (ref 74–97)
MONOCYTES # BLD: 1 K/UL (ref 0.05–1.2)
MONOCYTES NFR BLD AUTO: 6 % (ref 3–10)
MUCOUS THREADS URNS QL MICRO: ABNORMAL /LPF
NEUTS SEG # BLD: 11.8 K/UL (ref 1.8–8)
NEUTS SEG NFR BLD AUTO: 73 % (ref 40–73)
NITRITE UR QL STRIP.AUTO: NEGATIVE
PH UR STRIP: 6 [PH] (ref 5–8)
PLATELET # BLD AUTO: 316 K/UL (ref 135–420)
PMV BLD AUTO: 10.4 FL (ref 9.2–11.8)
POTASSIUM SERPL-SCNC: 3.8 MMOL/L (ref 3.5–5.5)
PROT SERPL-MCNC: 7.5 G/DL (ref 6.4–8.2)
PROT UR STRIP-MCNC: NEGATIVE MG/DL
RBC # BLD AUTO: 4.65 M/UL (ref 4.2–5.3)
RBC #/AREA URNS HPF: ABNORMAL /HPF (ref 0–5)
SODIUM SERPL-SCNC: 140 MMOL/L (ref 136–145)
SP GR UR REFRACTOMETRY: 1.02 (ref 1–1.03)
UROBILINOGEN UR QL STRIP.AUTO: 1 EU/DL (ref 0.2–1)
WBC # BLD AUTO: 16.1 K/UL (ref 4.6–13.2)
WBC URNS QL MICRO: ABNORMAL /HPF (ref 0–4)

## 2017-05-14 PROCEDURE — 83690 ASSAY OF LIPASE: CPT | Performed by: EMERGENCY MEDICINE

## 2017-05-14 PROCEDURE — 74011250636 HC RX REV CODE- 250/636: Performed by: EMERGENCY MEDICINE

## 2017-05-14 PROCEDURE — 96374 THER/PROPH/DIAG INJ IV PUSH: CPT

## 2017-05-14 PROCEDURE — 81001 URINALYSIS AUTO W/SCOPE: CPT | Performed by: EMERGENCY MEDICINE

## 2017-05-14 PROCEDURE — 99283 EMERGENCY DEPT VISIT LOW MDM: CPT

## 2017-05-14 PROCEDURE — 85025 COMPLETE CBC W/AUTO DIFF WBC: CPT | Performed by: EMERGENCY MEDICINE

## 2017-05-14 PROCEDURE — 96375 TX/PRO/DX INJ NEW DRUG ADDON: CPT

## 2017-05-14 PROCEDURE — 80053 COMPREHEN METABOLIC PANEL: CPT | Performed by: EMERGENCY MEDICINE

## 2017-05-14 RX ORDER — HYDROMORPHONE HYDROCHLORIDE 1 MG/ML
0.5 INJECTION, SOLUTION INTRAMUSCULAR; INTRAVENOUS; SUBCUTANEOUS ONCE
Status: COMPLETED | OUTPATIENT
Start: 2017-05-14 | End: 2017-05-14

## 2017-05-14 RX ORDER — ONDANSETRON 2 MG/ML
4 INJECTION INTRAMUSCULAR; INTRAVENOUS
Status: COMPLETED | OUTPATIENT
Start: 2017-05-14 | End: 2017-05-14

## 2017-05-14 RX ADMIN — HYDROMORPHONE HYDROCHLORIDE 0.5 MG: 1 INJECTION, SOLUTION INTRAMUSCULAR; INTRAVENOUS; SUBCUTANEOUS at 10:23

## 2017-05-14 RX ADMIN — ONDANSETRON 4 MG: 2 INJECTION INTRAMUSCULAR; INTRAVENOUS at 10:18

## 2017-05-14 NOTE — ED TRIAGE NOTES
Pt presents to the ED with mid abdominal pain, nausea, and vomiting. Pt reports was seen in this ED x3 days ago. Pt denies improvement.  Pt appears in NOAD

## 2017-05-14 NOTE — DISCHARGE INSTRUCTIONS

## 2017-05-14 NOTE — ED PROVIDER NOTES
HPI Comments: 9:48 AM Madisyn Reynaga is a 25 y.o. female with a h/o PARMINDER, APPY, and Depression presents to the ED with her mother with c/o lower abd pain onset 3 days ago that radiates into her back. Pt states she was seen in the ED 3 days ago and was prescribed Vicodin. Pt reports nausea, vomiting, and dizziness. Pt denies diarrhea, chest pain, or cough. All other sx denied. No other complaints at this time. Luis Faust MD      Patient is a 25 y.o. female presenting with abdominal pain, vomiting, and dizziness. The history is provided by the patient. Abdominal Pain    Associated symptoms include nausea, vomiting and back pain. Pertinent negatives include no fever, no diarrhea, no dysuria, no hematuria and no chest pain. Vomiting    Associated symptoms include abdominal pain. Pertinent negatives include no fever and no diarrhea. Dizziness   Associated symptoms include vomiting and nausea. Pertinent negatives include no shortness of breath and no chest pain. Past Medical History:   Diagnosis Date    ADHD (attention deficit hyperactivity disorder)     Asthma     Depression     Migraine     Sinus infection        Past Surgical History:   Procedure Laterality Date    APPENDECTOMY      HX CHOLECYSTECTOMY      HX HEENT      ear tubes bilaterally         Family History:   Problem Relation Age of Onset    Diabetes Maternal Aunt     Hypertension Maternal Grandmother     Diabetes Maternal Grandmother     Hypertension Maternal Grandfather        Social History     Social History    Marital status: SINGLE     Spouse name: N/A    Number of children: N/A    Years of education: N/A     Occupational History    Not on file.      Social History Main Topics    Smoking status: Never Smoker    Smokeless tobacco: Never Used    Alcohol use 0.6 oz/week     1 Cans of beer per week    Drug use: No    Sexual activity: Not Currently     Partners: Male     Birth control/ protection: Condom, Implant     Other Topics Concern    Not on file     Social History Narrative         ALLERGIES: Amoxicillin and Pineapple    Review of Systems   Constitutional: Negative for activity change, fatigue and fever. HENT: Negative for congestion and rhinorrhea. Eyes: Negative for visual disturbance. Respiratory: Negative for shortness of breath. Cardiovascular: Negative for chest pain and palpitations. Gastrointestinal: Positive for abdominal pain, nausea and vomiting. Negative for diarrhea. Genitourinary: Negative for dysuria and hematuria. Musculoskeletal: Positive for back pain. Skin: Negative for rash. Allergic/Immunologic: Negative for immunocompromised state. Neurological: Positive for dizziness. Negative for weakness and light-headedness. All other systems reviewed and are negative. Vitals:    05/14/17 0934   BP: 128/83   Pulse: (!) 103   Resp: 16   Temp: 98.8 °F (37.1 °C)   SpO2: 94%   Weight: 99.3 kg (219 lb)   Height: 5' 2\" (1.575 m)            Physical Exam   Constitutional: She is oriented to person, place, and time. She appears well-developed and well-nourished. No distress. HENT:   Head: Normocephalic and atraumatic. Mouth/Throat: Oropharynx is clear and moist.   Eyes: Conjunctivae and EOM are normal. Pupils are equal, round, and reactive to light. No scleral icterus. Neck: Normal range of motion. Neck supple. Cardiovascular: Normal rate, regular rhythm and normal heart sounds. No murmur heard. Pulmonary/Chest: Effort normal and breath sounds normal. No respiratory distress. Abdominal: Soft. Bowel sounds are normal. She exhibits no distension. There is no tenderness. Musculoskeletal: She exhibits no edema. Lymphadenopathy:     She has no cervical adenopathy. Neurological: She is alert and oriented to person, place, and time. Coordination normal.   Skin: Skin is warm and dry. No rash noted. Psychiatric: She has a normal mood and affect.  Her behavior is normal.   Nursing note and vitals reviewed. MDM  Number of Diagnoses or Management Options  Diagnosis management comments: Results reviewed with pt and mom, they will F/U this week with her GI appt as scheduled. Deandre Hernández MD         Amount and/or Complexity of Data Reviewed  Clinical lab tests: ordered and reviewed      ED Course       Procedures      Vitals:  Patient Vitals for the past 12 hrs:   Temp Pulse Resp BP SpO2   05/14/17 0934 98.8 °F (37.1 °C) (!) 103 16 128/83 94 %         Medications ordered:   Medications - No data to display      Lab findings:  No results found for this or any previous visit (from the past 12 hour(s)). EKG interpretation by ED Physician:      X-Ray, CT or other radiology findings or impressions:  No orders to display       Progress notes, Consult notes or additional Procedure notes:       Reevaluation of patient:       Disposition:  Diagnosis: No diagnosis found. Disposition:     Follow-up Information     None            Patient's Medications   Start Taking    No medications on file   Continue Taking    ALBUTEROL (PROVENTIL HFA, VENTOLIN HFA, PROAIR HFA) 90 MCG/ACTUATION INHALER    Take 2 Puffs by inhalation every four (4) hours as needed for Wheezing or Shortness of Breath (or cough). BUTALBITAL-ACETAMINOPHEN (PHRENILIN)  MG TABLET    Take 1 Tab by mouth every four (4) hours as needed. Maximum 6 tabs daily. HYDROCODONE-ACETAMINOPHEN (NORCO) 5-325 MG PER TABLET    Take 1 Tab by mouth every six (6) hours as needed for Pain. Max Daily Amount: 4 Tabs. LITHIUM CARBONATE 300 MG CAPSULE    Take 600 mg by mouth two (2) times daily (with meals). MEDROXYPROGESTERONE ACETATE (DEPO-PROVERA IM)    by IntraMUSCular route. ONDANSETRON (ZOFRAN ODT) 4 MG DISINTEGRATING TABLET    Take 1 Tab by mouth every eight (8) hours as needed for Nausea.    These Medications have changed    No medications on file   Stop Taking    No medications on file     Scribe [address] scribing for and in the presence of Luz Marina Gustafson MD 9:39 AM, 05/14/17. Physician Attestation  I personally performed the services described in the documentation, reviewed the documentation, as recorded by the scribe in my presence, and it accurately and completely records my words and actions.     Luz Marina Gustafson MD 9:39 AM 05/14/17        Signed by : Karen Harkins, 05/14/17 at 9:39 AM

## 2017-05-31 PROBLEM — R35.0 URINARY FREQUENCY: Status: ACTIVE | Noted: 2017-05-31

## 2017-05-31 PROBLEM — F31.9 BIPOLAR 1 DISORDER (HCC): Status: ACTIVE | Noted: 2017-05-31

## 2017-05-31 PROBLEM — N32.81 OAB (OVERACTIVE BLADDER): Status: ACTIVE | Noted: 2017-05-31

## 2017-05-31 PROBLEM — M79.7 FIBROMYALGIA: Status: ACTIVE | Noted: 2017-05-31

## 2017-08-20 ENCOUNTER — APPOINTMENT (OUTPATIENT)
Dept: GENERAL RADIOLOGY | Age: 22
End: 2017-08-20
Attending: EMERGENCY MEDICINE
Payer: COMMERCIAL

## 2017-08-20 ENCOUNTER — HOSPITAL ENCOUNTER (EMERGENCY)
Age: 22
Discharge: HOME | End: 2017-08-20
Attending: EMERGENCY MEDICINE
Payer: COMMERCIAL

## 2017-08-20 VITALS
DIASTOLIC BLOOD PRESSURE: 86 MMHG | WEIGHT: 230 LBS | HEART RATE: 100 BPM | SYSTOLIC BLOOD PRESSURE: 129 MMHG | RESPIRATION RATE: 22 BRPM | BODY MASS INDEX: 42.33 KG/M2 | OXYGEN SATURATION: 100 % | TEMPERATURE: 98.7 F | HEIGHT: 62 IN

## 2017-08-20 DIAGNOSIS — J18.9 CAP (COMMUNITY ACQUIRED PNEUMONIA): Primary | ICD-10-CM

## 2017-08-20 PROCEDURE — 77030029684 HC NEB SM VOL KT MONA -A

## 2017-08-20 PROCEDURE — 71020 XR CHEST PA LAT: CPT

## 2017-08-20 PROCEDURE — 94640 AIRWAY INHALATION TREATMENT: CPT

## 2017-08-20 PROCEDURE — 74011000250 HC RX REV CODE- 250: Performed by: NURSE PRACTITIONER

## 2017-08-20 PROCEDURE — 99282 EMERGENCY DEPT VISIT SF MDM: CPT

## 2017-08-20 RX ORDER — ALBUTEROL SULFATE 0.83 MG/ML
2.5 SOLUTION RESPIRATORY (INHALATION)
Status: COMPLETED | OUTPATIENT
Start: 2017-08-20 | End: 2017-08-20

## 2017-08-20 RX ORDER — IPRATROPIUM BROMIDE AND ALBUTEROL SULFATE 2.5; .5 MG/3ML; MG/3ML
3 SOLUTION RESPIRATORY (INHALATION)
Status: COMPLETED | OUTPATIENT
Start: 2017-08-20 | End: 2017-08-20

## 2017-08-20 RX ORDER — HYDROCODONE POLISTIREX AND CHLORPHENIRAMINE POLISTIREX 10; 8 MG/5ML; MG/5ML
5 SUSPENSION, EXTENDED RELEASE ORAL
Qty: 60 ML | Refills: 0 | Status: SHIPPED | OUTPATIENT
Start: 2017-08-20 | End: 2017-08-28

## 2017-08-20 RX ORDER — AZITHROMYCIN 250 MG/1
TABLET, FILM COATED ORAL
Qty: 6 TAB | Refills: 0 | Status: SHIPPED | OUTPATIENT
Start: 2017-08-20 | End: 2017-08-28

## 2017-08-20 RX ADMIN — ALBUTEROL SULFATE 2.5 MG: 2.5 SOLUTION RESPIRATORY (INHALATION) at 12:35

## 2017-08-20 RX ADMIN — IPRATROPIUM BROMIDE AND ALBUTEROL SULFATE 3 ML: .5; 3 SOLUTION RESPIRATORY (INHALATION) at 12:16

## 2017-08-20 NOTE — ED NOTES
Nam Real is a 25 y.o. female that was discharged in stable condition. The patients diagnosis, condition and treatment were explained to  patient and aftercare instructions were given. The patient verbalized understanding. Patient armband removed and shredded.

## 2017-08-20 NOTE — ED NOTES
Purposeful rounding completed:    Side rails up x 1:  YES  Bed in low position and wheels locked: YES  Call bell within reach: YES  Comfort addressed: YES    Toileting needs addressed: YES  Plan of care reviewed/updated with patient and or family members: YES  IV site assessed: N/A

## 2017-08-20 NOTE — DISCHARGE INSTRUCTIONS
Pneumonia: Care Instructions  Your Care Instructions    Pneumonia is an infection of the lungs. Most cases are caused by infections from bacteria or viruses. Pneumonia may be mild or very severe. If it is caused by bacteria, you will be treated with antibiotics. It may take a few weeks to a few months to recover fully from pneumonia, depending on how sick you were and whether your overall health is good. Follow-up care is a key part of your treatment and safety. Be sure to make and go to all appointments, and call your doctor if you are having problems. Its also a good idea to know your test results and keep a list of the medicines you take. How can you care for yourself at home? · Take your antibiotics exactly as directed. Do not stop taking the medicine just because you are feeling better. You need to take the full course of antibiotics. · Take your medicines exactly as prescribed. Call your doctor if you think you are having a problem with your medicine. · Get plenty of rest and sleep. You may feel weak and tired for a while, but your energy level will improve with time. · To prevent dehydration, drink plenty of fluids, enough so that your urine is light yellow or clear like water. Choose water and other caffeine-free clear liquids until you feel better. If you have kidney, heart, or liver disease and have to limit fluids, talk with your doctor before you increase the amount of fluids you drink. · Take care of your cough so you can rest. A cough that brings up mucus from your lungs is common with pneumonia. It is one way your body gets rid of the infection. But if coughing keeps you from resting or causes severe fatigue and chest-wall pain, talk to your doctor. He or she may suggest that you take a medicine to reduce the cough. · Use a vaporizer or humidifier to add moisture to your bedroom. Follow the directions for cleaning the machine. · Do not smoke or allow others to smoke around you.  Smoke will make your cough last longer. If you need help quitting, talk to your doctor about stop-smoking programs and medicines. These can increase your chances of quitting for good. · Take an over-the-counter pain medicine, such as acetaminophen (Tylenol), ibuprofen (Advil, Motrin), or naproxen (Aleve). Read and follow all instructions on the label. · Do not take two or more pain medicines at the same time unless the doctor told you to. Many pain medicines have acetaminophen, which is Tylenol. Too much acetaminophen (Tylenol) can be harmful. · If you were given a spirometer to measure how well your lungs are working, use it as instructed. This can help your doctor tell how your recovery is going. · To prevent pneumonia in the future, talk to your doctor about getting a flu vaccine (once a year) and a pneumococcal vaccine (one time only for most people). When should you call for help? Call 911 anytime you think you may need emergency care. For example, call if:  · You have severe trouble breathing. Call your doctor now or seek immediate medical care if:  · You cough up dark brown or bloody mucus (sputum). · You have new or worse trouble breathing. · You are dizzy or lightheaded, or you feel like you may faint. Watch closely for changes in your health, and be sure to contact your doctor if:  · You have a new or higher fever. · You are coughing more deeply or more often. · You are not getting better after 2 days (48 hours). · You do not get better as expected. Where can you learn more? Go to http://ruba-silvia.info/. Enter [id number] in the search box to learn more about \"Pneumonia: Care Instructions. \"  Current as of: March 25, 2017  Content Version: 11.3  © 8664-4473 [employer]. Care instructions adapted under license by [employer] (which disclaims liability or warranty for this information).  If you have questions about a medical condition or this instruction, always ask your healthcare professional. [place] [address] any warranty or liability for your use of this information. [employer] Activation    Thank you for requesting access to [facility]ings. Please follow the instructions below to securely access and download your online medical record. [facility]ings allows you to send messages to your doctor, view your test results, renew your prescriptions, schedule appointments, and more. How Do I Sign Up? 1. In your internet browser, go to [email or web address]  2. Click on the First Time User? Click Here link in the Sign In box. You will be redirect to the New Member Sign Up page. 3. Enter your [facility] Access Code exactly as it appears below. You will not need to use this code after youve completed the sign-up process. If you do not sign up before the expiration date, you must request a new code. [facility] Access Code: Activation code not generated  Current [facility]ings Status: Patient Declined (This is the date your [facility] [employer]ings access code will )    4. Enter the last four digits of your Social Security Number (xxxx) and Date of Birth (mm/dd/yyyy) as indicated and click Submit. You will be taken to the next sign-up page. 5. Create a [facility]ings ID. This will be your [facility] login ID and cannot be changed, so think of one that is secure and easy to remember. 6. Create a [facility] password. You can change your password at any time. 7. Enter your Password Reset Question and Answer. This can be used at a later time if you forget your password. 8. Enter your e-mail address. You will receive e-mail notification when new information is available in [address]. 9. Click Sign Up. You can now view and download portions of your medical record. 10. Click the Download Summary menu link to download a portable copy of your medical information.     Additional Information    If you have questions, please visit the Frequently Asked Questions section of the [employer]ings website at [email or web address] Remember, TranscribeMe is NOT to be used for urgent needs. For medical emergencies, dial 911.

## 2017-08-20 NOTE — ED TRIAGE NOTES
Pt presents to the ED with chest tightness, cough, and chest congestion onset intermittently in 06/2017. Pt reports recent allergy injections x1 week ago, states onset for worsening cough and congestion. Pt reports feeling SOB.

## 2017-08-20 NOTE — ED PROVIDER NOTES
HPI Comments: 11:44 AM   25 y.o. female presents to ED C/O cough, SOB. Patient has a HX of asthma, ADHD, depression, Cholecystectomy, appendectomy. Patient reports intermittent cough since June, worse over the last week. Patient reports coughis not productive, dry and hacking, intermittent fever x 1 week, TMAX 5 days ago was 102. Patient reports feeling SOB, sometimes at rest.  Patient reports previous use of depo and had IUD placed 4 days. Patient reports chest aches during and after coughing, generalized anterior region. Patient unsure of last menses but negative pregnancy test this week. Patient is a nonsmoker, however frequent exposure. Patient started allergy shots last month, and has seen PCP in the past and received cough medication, no previous antibiotic treatments. Patient denies any other symptoms or complaints. The history is provided by the patient. History limited by: No language barrier. Past Medical History:   Diagnosis Date    ADHD (attention deficit hyperactivity disorder)     Asthma     Depression     Fibromyalgia 05/2017    Migraine     Overactive bladder     Sinus infection        Past Surgical History:   Procedure Laterality Date    APPENDECTOMY      HX CHOLECYSTECTOMY      HX HEENT      ear tubes bilaterally         Family History:   Problem Relation Age of Onset    Diabetes Maternal Aunt     Hypertension Maternal Grandmother     Diabetes Maternal Grandmother     Hypertension Maternal Grandfather        Social History     Social History    Marital status: SINGLE     Spouse name: N/A    Number of children: N/A    Years of education: N/A     Occupational History    Not on file.      Social History Main Topics    Smoking status: Never Smoker    Smokeless tobacco: Never Used    Alcohol use 0.6 oz/week     1 Cans of beer per week    Drug use: No    Sexual activity: Not Currently     Partners: Male     Birth control/ protection: Condom, Implant     Other Topics Concern    Not on file     Social History Narrative         ALLERGIES: Amoxicillin and Pineapple    Review of Systems   Constitutional: Negative for appetite change and fever. HENT: Negative for congestion, rhinorrhea and sore throat. Respiratory: Positive for cough, chest tightness and shortness of breath. Negative for wheezing. Cardiovascular: Positive for chest pain. Negative for leg swelling. Gastrointestinal: Negative for abdominal pain, constipation, diarrhea, nausea and vomiting. Genitourinary: Negative for dysuria. Musculoskeletal: Negative for arthralgias and back pain. Neurological: Negative for dizziness, syncope and headaches. Vitals:    08/20/17 1120   BP: 129/86   Pulse: 100   Resp: 22   Temp: 98.7 °F (37.1 °C)   SpO2: 100%   Weight: 104.3 kg (230 lb)   Height: 5' 2\" (1.575 m)            Physical Exam   Constitutional: She is oriented to person, place, and time. She appears well-developed and well-nourished. No distress. HENT:   Head: Normocephalic and atraumatic. Right Ear: External ear normal.   Left Ear: External ear normal.   Nose: Nose normal.   Eyes: Conjunctivae and EOM are normal.   Cardiovascular: Normal rate, regular rhythm and normal heart sounds. Pulmonary/Chest: No tachypnea. No respiratory distress. She has wheezes in the right lower field. She has rhonchi in the right lower field. She exhibits tenderness. Patient speaking in full sentences    Musculoskeletal: Normal range of motion. Neurological: She is alert and oriented to person, place, and time. She exhibits normal muscle tone. Coordination normal.   Skin: Skin is warm and dry. No rash noted. She is not diaphoretic. No erythema. No pallor. Nursing note and vitals reviewed.        MDM  Number of Diagnoses or Management Options  CAP (community acquired pneumonia):   Diagnosis management comments: Differential Diagnosis: COPD, asthma exacerbation, PE, CAD, angina/MI, PNA, bronchitis, URI, airway FB, PTX, tPTX, CHF, pHTN, angioedema, anaphylaxis, anxiety/panic attack    Plan: chest xray. I have a low clinical concern for PE, symptoms for greater than 4 weeks, cough/ sob associated with fever.   -Chest xray - IMPRESSION:     Mild increased haziness at the medial right lung base may represent pneumonia or  atelectasis.     12:29 PM patient's breath sounds are now clear. Patient reports feeling better. Due to physical exam, HPI and chest xray results, will treat for pneumonia, patient educated to follow-up with PCP in 1 week for recheck. Patient educated to return to the ED for any new or worsening symptoms. Questions denied. Amount and/or Complexity of Data Reviewed  Tests in the radiology section of CPT®: ordered and reviewed      ED Course       Procedures             RESULTS:    XR CHEST PA LAT   Final Result          Labs Reviewed - No data to display    No results found for this or any previous visit (from the past 12 hour(s)). PROGRESS NOTE:   11:45 AM   Initial assessment completed. Written by Mercy HOLDENC    DISCHARGE NOTE:  12:31 PM   Enrigue Console  results have been reviewed with her. She has been counseled regarding her diagnosis, treatment, and plan. She verbally conveys understanding and agreement of the signs, symptoms, diagnosis, treatment and prognosis and additionally agrees to follow up as discussed. She also agrees with the care-plan and conveys that all of her questions have been answered. I have also provided discharge instructions for her that include: educational information regarding their diagnosis and treatment, and list of reasons why they would want to return to the ED prior to their follow-up appointment, should her condition change.      CLINICAL IMPRESSION:    1. CAP (community acquired pneumonia)        AFTER VISIT PLAN:    Current Discharge Medication List      START taking these medications    Details   azithromycin (ZITHROMAX Z-RAMON) 250 mg tablet Take two tabs day 1, take one tab days 2-5  Qty: 6 Tab, Refills: 0      chlorpheniramine-HYDROcodone (TUSSIONEX PENNKINETIC ER) 10-8 mg/5 mL suspension Take 5 mL by mouth every twelve (12) hours as needed for Cough. Max Daily Amount: 10 mL.   Qty: 60 mL, Refills: 0              Follow-up Information     Follow up With Details Comments Contact Info    Bautista Rapp MD Schedule an appointment as soon as possible for a visit in 2 weeks Further evaluation [address]  [phone]             Written by Cecil HOLDENC

## 2017-08-24 ENCOUNTER — HOSPITAL ENCOUNTER (OUTPATIENT)
Dept: LAB | Age: 22
Discharge: HOME | End: 2017-08-24
Payer: COMMERCIAL

## 2017-08-24 LAB
ALBUMIN SERPL-MCNC: 3.5 G/DL (ref 3.4–5)
ALBUMIN/GLOB SERPL: 1.2 {RATIO} (ref 0.8–1.7)
ALP SERPL-CCNC: 98 U/L (ref 45–117)
ALT SERPL-CCNC: 19 U/L (ref 13–56)
ANION GAP SERPL CALC-SCNC: 8 MMOL/L (ref 3–18)
AST SERPL-CCNC: 12 U/L (ref 15–37)
BASOPHILS # BLD: 0 K/UL (ref 0–0.06)
BASOPHILS NFR BLD: 0 % (ref 0–2)
BILIRUB SERPL-MCNC: 0.3 MG/DL (ref 0.2–1)
BUN SERPL-MCNC: 5 MG/DL (ref 7–18)
BUN/CREAT SERPL: 6 (ref 12–20)
CALCIUM SERPL-MCNC: 8.9 MG/DL (ref 8.5–10.1)
CHLORIDE SERPL-SCNC: 105 MMOL/L (ref 100–108)
CO2 SERPL-SCNC: 26 MMOL/L (ref 21–32)
CREAT SERPL-MCNC: 0.8 MG/DL (ref 0.6–1.3)
DIFFERENTIAL METHOD BLD: ABNORMAL
EOSINOPHIL # BLD: 0.2 K/UL (ref 0–0.4)
EOSINOPHIL NFR BLD: 1 % (ref 0–5)
ERYTHROCYTE [DISTWIDTH] IN BLOOD BY AUTOMATED COUNT: 14.7 % (ref 11.6–14.5)
GLOBULIN SER CALC-MCNC: 3 G/DL (ref 2–4)
GLUCOSE SERPL-MCNC: 111 MG/DL (ref 74–99)
HCT VFR BLD AUTO: 38.1 % (ref 35–45)
HGB BLD-MCNC: 11.8 G/DL (ref 12–16)
LYMPHOCYTES # BLD: 2.6 K/UL (ref 0.9–3.6)
LYMPHOCYTES NFR BLD: 16 % (ref 21–52)
MCH RBC QN AUTO: 26.7 PG (ref 24–34)
MCHC RBC AUTO-ENTMCNC: 31 G/DL (ref 31–37)
MCV RBC AUTO: 86.2 FL (ref 74–97)
MONOCYTES # BLD: 0.6 K/UL (ref 0.05–1.2)
MONOCYTES NFR BLD: 4 % (ref 3–10)
NEUTS SEG # BLD: 12.2 K/UL (ref 1.8–8)
NEUTS SEG NFR BLD: 79 % (ref 40–73)
PLATELET # BLD AUTO: 296 K/UL (ref 135–420)
PMV BLD AUTO: 10.6 FL (ref 9.2–11.8)
POTASSIUM SERPL-SCNC: 3.6 MMOL/L (ref 3.5–5.5)
PROT SERPL-MCNC: 6.5 G/DL (ref 6.4–8.2)
RBC # BLD AUTO: 4.42 M/UL (ref 4.2–5.3)
SODIUM SERPL-SCNC: 139 MMOL/L (ref 136–145)
WBC # BLD AUTO: 15.6 K/UL (ref 4.6–13.2)

## 2017-08-24 PROCEDURE — 82784 ASSAY IGA/IGD/IGG/IGM EACH: CPT | Performed by: ALLERGY & IMMUNOLOGY

## 2017-08-24 PROCEDURE — 85025 COMPLETE CBC W/AUTO DIFF WBC: CPT | Performed by: ALLERGY & IMMUNOLOGY

## 2017-08-24 PROCEDURE — 80053 COMPREHEN METABOLIC PANEL: CPT | Performed by: ALLERGY & IMMUNOLOGY

## 2017-08-24 PROCEDURE — 36415 COLL VENOUS BLD VENIPUNCTURE: CPT | Performed by: ALLERGY & IMMUNOLOGY

## 2017-08-24 PROCEDURE — 82785 ASSAY OF IGE: CPT | Performed by: ALLERGY & IMMUNOLOGY

## 2017-08-25 LAB
IGA SERPL-MCNC: 105 MG/DL (ref 87–352)
IGE SERPL-ACNC: 7 IU/ML (ref 0–100)
IGG SERPL-MCNC: 558 MG/DL (ref 700–1600)
IGM SERPL-MCNC: 135 MG/DL (ref 26–217)

## 2017-09-19 ENCOUNTER — HOSPITAL ENCOUNTER (OUTPATIENT)
Dept: LAB | Age: 22
Discharge: HOME | End: 2017-09-19
Payer: COMMERCIAL

## 2017-09-19 LAB
BASOPHILS # BLD: 0 K/UL (ref 0–0.06)
BASOPHILS NFR BLD: 0 % (ref 0–2)
DIFFERENTIAL METHOD BLD: NORMAL
EOSINOPHIL # BLD: 0.2 K/UL (ref 0–0.4)
EOSINOPHIL NFR BLD: 2 % (ref 0–5)
ERYTHROCYTE [DISTWIDTH] IN BLOOD BY AUTOMATED COUNT: 14.3 % (ref 11.6–14.5)
HCT VFR BLD AUTO: 39.7 % (ref 35–45)
HGB BLD-MCNC: 12.6 G/DL (ref 12–16)
LYMPHOCYTES # BLD: 2.2 K/UL (ref 0.9–3.6)
LYMPHOCYTES NFR BLD: 21 % (ref 21–52)
MCH RBC QN AUTO: 26.8 PG (ref 24–34)
MCHC RBC AUTO-ENTMCNC: 31.7 G/DL (ref 31–37)
MCV RBC AUTO: 84.3 FL (ref 74–97)
MONOCYTES # BLD: 0.4 K/UL (ref 0.05–1.2)
MONOCYTES NFR BLD: 4 % (ref 3–10)
NEUTS SEG # BLD: 7.3 K/UL (ref 1.8–8)
NEUTS SEG NFR BLD: 73 % (ref 40–73)
PLATELET # BLD AUTO: 306 K/UL (ref 135–420)
PMV BLD AUTO: 10.7 FL (ref 9.2–11.8)
RBC # BLD AUTO: 4.71 M/UL (ref 4.2–5.3)
WBC # BLD AUTO: 10.1 K/UL (ref 4.6–13.2)

## 2017-09-19 PROCEDURE — 86684 HEMOPHILUS INFLUENZA ANTIBDY: CPT | Performed by: ALLERGY & IMMUNOLOGY

## 2017-09-19 PROCEDURE — 86609 BACTERIUM ANTIBODY: CPT | Performed by: ALLERGY & IMMUNOLOGY

## 2017-09-19 PROCEDURE — 86317 IMMUNOASSAY INFECTIOUS AGENT: CPT | Performed by: ALLERGY & IMMUNOLOGY

## 2017-09-19 PROCEDURE — 82787 IGG 1 2 3 OR 4 EACH: CPT | Performed by: ALLERGY & IMMUNOLOGY

## 2017-09-19 PROCEDURE — 85025 COMPLETE CBC W/AUTO DIFF WBC: CPT | Performed by: ALLERGY & IMMUNOLOGY

## 2017-09-19 PROCEDURE — 36415 COLL VENOUS BLD VENIPUNCTURE: CPT | Performed by: ALLERGY & IMMUNOLOGY

## 2017-09-20 LAB — HAEM INFLU B IGG SER IA-MCNC: 0.43 UG/ML

## 2017-09-21 LAB
C DIPHTHERIAE AB SER IA-ACNC: 0.7 IU/ML
C TETANI IGG SER IA-ACNC: 4.46 IU/ML
IGG SERPL-MCNC: 584 MG/DL (ref 700–1600)
IGG1 SER-MCNC: 328 MG/DL (ref 248–810)
IGG2 SER-MCNC: 185 MG/DL (ref 130–555)
IGG3 SER-MCNC: 37 MG/DL (ref 15–102)
IGG4 SER-MCNC: 1 MG/DL (ref 2–96)

## 2017-09-27 LAB
DEPRECATED S PNEUM 1 IGG SER-MCNC: <0.3 UG/ML
DEPRECATED S PNEUM12 IGG SER-MCNC: <0.3 UG/ML
DEPRECATED S PNEUM14 IGG SER-MCNC: 0.4 UG/ML
DEPRECATED S PNEUM19 IGG SER-MCNC: 2 UG/ML
DEPRECATED S PNEUM23 IGG SER-MCNC: <0.3 UG/ML
DEPRECATED S PNEUM3 IGG SER-MCNC: 1.8 UG/ML
DEPRECATED S PNEUM4 IGG SER-MCNC: 0.4 UG/ML
DEPRECATED S PNEUM8 IGG SER-MCNC: 0.4 UG/ML
DEPRECATED S PNEUM9 IGG SER-MCNC: 0.4 UG/ML
PNEUMO AB TYPE 17 (17F): 1.3 UG/ML
PNEUMO AB TYPE 2*: <0.3 UG/ML
PNEUMO AB TYPE 20*: 0.8 UG/ML
PNEUMO AB TYPE 22 (22F)*: 11.9 UG/ML
PNEUMO AB TYPE 34 (10A)*: 0.7 UG/ML
PNEUMO AB TYPE 43 (11A)*: <0.3 UG/ML
PNEUMO AB TYPE 5*: 0.4 UG/ML
PNEUMO AB TYPE 54 (15B)*: 0.4 UG/ML
PNEUMO AB TYPE 70 (33F)*: 0.7 UG/ML
S PNEUM DA 18C IGG SER-MCNC: <0.3 UG/ML
S PNEUM DA 19A IGG SER-MCNC: 2.4 UG/ML
S PNEUM DA 6B IGG SER-MCNC: 0.8 UG/ML
S PNEUM DA 7F IGG SER-MCNC: 0.9 UG/ML
S PNEUM DA 9V IGG SER-MCNC: 1 UG/ML

## 2017-11-14 ENCOUNTER — HOSPITAL ENCOUNTER (OUTPATIENT)
Dept: LAB | Age: 22
Discharge: HOME | End: 2017-11-14
Payer: COMMERCIAL

## 2017-11-14 PROCEDURE — 36415 COLL VENOUS BLD VENIPUNCTURE: CPT | Performed by: ALLERGY & IMMUNOLOGY

## 2017-11-14 PROCEDURE — 86609 BACTERIUM ANTIBODY: CPT | Performed by: ALLERGY & IMMUNOLOGY

## 2017-11-21 LAB
DEPRECATED S PNEUM 1 IGG SER-MCNC: 0.4 UG/ML
DEPRECATED S PNEUM12 IGG SER-MCNC: 0.4 UG/ML
DEPRECATED S PNEUM14 IGG SER-MCNC: 2.5 UG/ML
DEPRECATED S PNEUM19 IGG SER-MCNC: 2.6 UG/ML
DEPRECATED S PNEUM23 IGG SER-MCNC: 0.2 UG/ML
DEPRECATED S PNEUM3 IGG SER-MCNC: 1.4 UG/ML
DEPRECATED S PNEUM4 IGG SER-MCNC: 0.4 UG/ML
DEPRECATED S PNEUM8 IGG SER-MCNC: 7.7 UG/ML
DEPRECATED S PNEUM9 IGG SER-MCNC: 0.5 UG/ML
PNEUMO AB TYPE 17 (17F): 1.7 UG/ML
PNEUMO AB TYPE 2*: 1.3 UG/ML
PNEUMO AB TYPE 20*: 0.6 UG/ML
PNEUMO AB TYPE 22 (22F)*: >59.2 UG/ML
PNEUMO AB TYPE 34 (10A)*: 0.9 UG/ML
PNEUMO AB TYPE 43 (11A)*: 2.8 UG/ML
PNEUMO AB TYPE 5*: 0.5 UG/ML
PNEUMO AB TYPE 54 (15B)*: 0.3 UG/ML
PNEUMO AB TYPE 70 (33F)*: 0.7 UG/ML
S PNEUM DA 18C IGG SER-MCNC: 0.5 UG/ML
S PNEUM DA 19A IGG SER-MCNC: 4.8 UG/ML
S PNEUM DA 6B IGG SER-MCNC: 3.1 UG/ML
S PNEUM DA 7F IGG SER-MCNC: 1 UG/ML
S PNEUM DA 9V IGG SER-MCNC: 3.8 UG/ML

## 2017-12-28 PROBLEM — E66.01 OBESITY, MORBID (HCC): Status: ACTIVE | Noted: 2017-12-28

## 2018-01-23 ENCOUNTER — OFFICE VISIT (OUTPATIENT)
Dept: ORTHOPEDIC SURGERY | Age: 23
End: 2018-01-23

## 2018-01-23 VITALS
TEMPERATURE: 95.6 F | OXYGEN SATURATION: 98 % | SYSTOLIC BLOOD PRESSURE: 121 MMHG | HEIGHT: 62 IN | HEART RATE: 114 BPM | WEIGHT: 234.8 LBS | BODY MASS INDEX: 43.21 KG/M2 | DIASTOLIC BLOOD PRESSURE: 84 MMHG

## 2018-01-23 DIAGNOSIS — M25.562 LEFT KNEE PAIN, UNSPECIFIED CHRONICITY: ICD-10-CM

## 2018-01-23 DIAGNOSIS — M76.52 PATELLAR TENDINITIS, LEFT KNEE: Primary | ICD-10-CM

## 2018-01-23 RX ORDER — MELOXICAM 15 MG/1
15 TABLET ORAL
Qty: 30 TAB | Refills: 1 | Status: SHIPPED | OUTPATIENT
Start: 2018-01-23 | End: 2018-04-06

## 2018-01-23 NOTE — PROGRESS NOTES
Verbal order given by Dr. Willam Villarreal to sign order for physical therapy entered by Judge Carpio.

## 2018-01-23 NOTE — PROGRESS NOTES
Kaushik Schwarz  1995   Chief Complaint   Patient presents with    Knee Pain     knee        HISTORY OF PRESENT ILLNESS  Kaushik Schwarz is a 25 y.o. female who presents today for evaluation of left knee pain. she rates her pain 5/10 today. She was previously seen by Dr. Mani López. Pain has been present for one year. Denies initial injury. Patient describes the pain as aching, burning, sharp and throbbing that is Intermittent in nature. Symptoms are worse with working out at [employer], prolonged walking and standing, Activity, Exercise and is better with  Rest. Associated symptoms include stiffness. Since problem started, it: has worsened. Pain does not wake patient up at night. Has taken no recent medications for the problem. Has tried following treatments: Injections:NO; Brace:NO; Therapy:NO; Cane/Crutch:NO       Allergies   Allergen Reactions    Pineapple Unknown (comments)     Facial numbness, swelling        Past Medical History:   Diagnosis Date    ADHD (attention deficit hyperactivity disorder)     Asthma     Depression     Fibromyalgia 05/2017    Migraine     Overactive bladder     Sinus infection       Social History     Social History    Marital status: SINGLE     Spouse name: N/A    Number of children: N/A    Years of education: N/A     Occupational History    Not on file.      Social History Main Topics    Smoking status: Never Smoker    Smokeless tobacco: Never Used    Alcohol use 0.6 oz/week     1 Cans of beer per week    Drug use: No    Sexual activity: Not Currently     Partners: Male     Birth control/ protection: Condom, Implant     Other Topics Concern    Not on file     Social History Narrative      Past Surgical History:   Procedure Laterality Date    APPENDECTOMY      HX CHOLECYSTECTOMY      HX HEENT      ear tubes bilaterally    HX UROLOGICAL  01/18/2018    Cysto, inj Botox 200 units with Dr. Pati Shelton [facility]      Family History Problem Relation Age of Onset    Diabetes Maternal Aunt     Hypertension Maternal Grandmother     Diabetes Maternal Grandmother     Hypertension Maternal Grandfather       Current Outpatient Prescriptions   Medication Sig    meloxicam (MOBIC) 15 mg tablet Take 1 Tab by mouth daily (with breakfast).  levothyroxine (SYNTHROID) 75 mcg tablet     LORazepam (ATIVAN) 0.5 mg tablet     busPIRone (BUSPAR) 5 mg tablet take 1 tablet by mouth twice a day    promethazine-codeine (PHENERGAN WITH CODEINE) 6.25-10 mg/5 mL syrup take 5 milliliters by mouth every 6 hours if needed    ARIPiprazole (ABILIFY) 2 mg tablet     budesonide (PULMICORT) 0.5 mg/2 mL nbsp inhale contents of 1 vial in nebulizer twice a day    montelukast (SINGULAIR) 10 mg tablet     tretinoin (RETIN-A) 0.025 % topical cream apply EVERY NIGHT TO FACE AS TOLERATED    ketoconazole (NIZORAL) 2 % shampoo LATHER AND LEAVE ON FOR 5 MINUTES AND RINSE. DAILY FOR FOUR WEEKS    doxycycline (ADOXA) 100 mg tablet     ammonium lactate (AMLACTIN) 12 % topical cream apply to affected area twice a day if needed    mirabegron ER (MYRBETRIQ) 50 mg ER tablet Take 1 Tab by mouth daily.  ondansetron (ZOFRAN ODT) 4 mg disintegrating tablet Take 1 Tab by mouth every eight (8) hours as needed for Nausea.  oxybutynin chloride XL (DITROPAN XL) 10 mg CR tablet take 1 tablet by mouth once daily    LORazepam (ATIVAN) 1 mg tablet take 1 tablet by mouth MAY REPEAT IN 1 HOUR IF NEEDED    albuterol-ipratropium (DUO-NEB) 2.5 mg-0.5 mg/3 ml nebu inhale contents of 1 vial every 4 to 6 hours in nebulizer if needed    VYVANSE 40 mg capsule take 1 capsule by mouth every morning    gabapentin (NEURONTIN) 300 mg capsule take 1 capsule by mouth three times a day    lithium carbonate 300 mg capsule Take 600 mg by mouth two (2) times daily (with meals).     albuterol (PROVENTIL HFA, VENTOLIN HFA, PROAIR HFA) 90 mcg/actuation inhaler Take 2 Puffs by inhalation every four (4) hours as needed for Wheezing or Shortness of Breath (or cough).  azithromycin (ZITHROMAX) 500 mg tab take 1 tablet by mouth PER WEEK    amoxicillin-clavulanate (AUGMENTIN) 875-125 mg per tablet take 1 tablet by mouth every 12 hours for 7 days    doxycycline (MONODOX) 50 mg capsule     benzonatate (TESSALON) 200 mg capsule take 1 capsule by mouth every 8 hours if needed     No current facility-administered medications for this visit. REVIEW OF SYSTEM   Patient denies: Weight loss, Fever/Chills, HA, Visual changes, Fatigue, Chest pain, SOB, Abdominal pain, N/V/D/C, Blood in stool or urine, Edema. Pertinent positive as above in HPI. All others were negative    PHYSICAL EXAM:   Visit Vitals    /84    Pulse (!) 114    Temp 95.6 °F (35.3 °C) (Oral)    Ht 5' 2\" (1.575 m)    Wt 234 lb 12.8 oz (106.5 kg)    SpO2 98%    BMI 42.95 kg/m2     The patient is a well-developed, well-nourished female   in no acute distress. The patient is alert and oriented times three. The patient is alert and oriented times three. Mood and affect are normal.  LYMPHATIC: lymph nodes are not enlarged and are within normal limits  SKIN: normal in color and non tender to palpation. There are no bruises or abrasions noted. NEUROLOGICAL: Motor sensory exam is within normal limits. Reflexes are equal bilaterally.  There is normal sensation to pinprick and light touch  MUSCULOSKELETAL:  Examination Left knee   Skin Intact   Range of motion 0-130   Effusion -   Medial joint line tenderness -   Lateral joint line tenderness -   Tenderness Pes Bursa -   Tenderness insertion MCL -   Tenderness insertion LCL -   Micks -   Patella crepitus -   Patella grind -   Lachman -   Pivot shift -   Anterior drawer -   Posterior drawer -   Varus stress -   Valgus stress -   Neurovascular Intact   Calf Swelling and Tenderness to Palpation -   Steve's Test -   Hamstring Cord Tightness +   Patella tenderness  IMAGING: XR of the left knee dated 1/23/18 was reviewed and read: normal      IMPRESSION:      ICD-10-CM ICD-9-CM    1. Patellar tendinitis, left knee M76.52 726.64 REFERRAL TO PHYSICAL THERAPY      meloxicam (MOBIC) 15 mg tablet   2. Left knee pain, unspecified chronicity M25.562 719.46 AMB POC XRAY, KNEE; 1/2 VIEWS        PLAN:  1. I believe the patient's left knee pain is coming from patellar tendinitis. Instructed her to avoid squats, lunges, kneeling in favor of leg presses. Advised her to work on hamstring stretching at PT. Risk factors include: n/a  2. No cortisone injection indicated today   3. Yes Physical Therapy indicated today HAMSTRING STRETCHES  4. No diagnostic test indicated today  5. No durable medical equipment indicated today  6. No referral indicated today   7. Yes medications indicated today MOBIC  8. No Narcotic indicated today       RTC 4 weeks  Follow-up Disposition: Not on File    Scribed by Dagoberto Garcia [facility]) as dictated by Nafisa Luciano MD    I, Dr. Nafisa Luciano, confirm that all documentation is accurate.     Nafisa Luciano M.D.   [facility]

## 2018-01-24 ENCOUNTER — HOSPITAL ENCOUNTER (OUTPATIENT)
Dept: PHYSICAL THERAPY | Age: 23
Discharge: HOME | End: 2018-01-24
Payer: COMMERCIAL

## 2018-01-24 PROCEDURE — 97162 PT EVAL MOD COMPLEX 30 MIN: CPT

## 2018-01-24 PROCEDURE — 97110 THERAPEUTIC EXERCISES: CPT

## 2018-01-24 NOTE — PROGRESS NOTES
[facility]  [place], [address]  [phone] fax    Plan of Care/ Statement of Necessity for Physical Therapy Services    Patient name: Jessica Chau Start of Care: 2018   Referral source: Gerrimariann Moore,* : 1995    Medical Diagnosis: Patellar tendinitis, left knee [M76.52]   Onset Date:    Treatment Diagnosis: L knee pain   Prior Hospitalization: see medical history Provider#: [id number]   Medications: Verified on Patient summary List    Comorbidities: fibromyalgia, depression, thyroid problems (controlled), asthma, appendectomy, cholescystectomy   Prior Level of Function: , goes to the gym but with reports of knee pain     The Plan of Care and following information is based on the information from the initial evaluation. Assessment/ key information: Pt is a 25 y.o. Female presenting with chronic history of L knee pain, with limitations noted in limited L knee extension in WB, poor squat mechanics, L hip flex and ext MMT 4-/5, (+) TTP along L patella/quad tendon/parellar ligament/infraspinatus fat pad, L hypermobile patella, (+) B MIKE, limited B hip IR in sitting, and FOTO score of 48. Pt would benefit from skilled PT intervention to address the above listed limitations and allow improved functional task completion.     Evaluation Complexity History MEDIUM  Complexity : 1-2 comorbidities / personal factors will impact the outcome/ POC ; Examination LOW Complexity : 1-2 Standardized tests and measures addressing body structure, function, activity limitation and / or participation in recreation  ;Presentation LOW Complexity : Stable, uncomplicated  ;Clinical Decision Making MEDIUM Complexity : FOTO score of 26-74  Overall Complexity Rating: MEDIUM  Problem List: pain affecting function, decrease ROM, decrease strength, impaired gait/ balance, decrease ADL/ functional abilitiies, decrease activity tolerance and decrease flexibility/ joint mobility   Treatment Plan may include any combination of the following: Therapeutic exercise, Therapeutic activities, Neuromuscular re-education, Physical agent/modality, Gait/balance training, Manual therapy, Patient education, Self Care training, Functional mobility training, Home safety training and Stair training  Patient / Family readiness to learn indicated by: asking questions, trying to perform skills and interest  Persons(s) to be included in education: patient (P)  Barriers to Learning/Limitations: None  Patient Goal (s): Less pain.   Patient Self Reported Health Status: good  Rehabilitation Potential: good    Short Term Goals: To be accomplished in two weeks:   1. Pt will demonstrate L hip flex and ext MMT to 5/5 to improve position changes with ADLs. 2.  Pt will demonstrate full L knee ext in static standing to improve gait mechanics. Long Term Goals: To be accomplished in three weeks:   1. Pt will demonstrate FOTO to 64 or greater to indicate improved functional task completion. 2.  Pt will demonstrate 10x air squats with proper form to allow return to lifting at the gym. 3.  Pt will report ability to walk two blocks or more without difficulty to return to community ambulation. Frequency / Duration: Patient to be seen 2 times per week for 3 weeks. Patient/ Caregiver education and instruction: Diagnosis, prognosis, self care, activity modification and exercises   [x]  Plan of care has been reviewed with TIA Desai PT 1/24/2018 1:33 PM    ________________________________________________________________________    I certify that the above Therapy Services are being furnished while the patient is under my care. I agree with the treatment plan and certify that this therapy is necessary.     [id number] Signature:____________________  Date:____________Time: _________    Please sign and return to [facility] [place] [address]  [place], [address]  [phone] fax

## 2018-01-24 NOTE — PROGRESS NOTES
SUBJECTIVE  Pain Level (0-10 scale): 5  []constant []intermittent []improving []worsening []no change since onset    Any medication changes, allergies to medications, adverse drug reactions, diagnosis change, or new procedure performed?: [x] No    [] Yes (see summary sheet for update)  Subjective functional status/changes:     PLOF: Gym - squats, lunges, upper body, some machine use  Limitations to PLOF: pain with gym activities  Mechanism of Injury: 2010 - pt reports she was a catcher while playing softball, and that the L knee pain gradually worsened overtime. She reports the pain is worse now than it was at previous episode of treatment, but similar in complaint. Current symptoms/Complaints: Burning from patellar tendon to mid patella  Previous Treatment/Compliance: PT for L knee in 2013  PMHx/Surgical Hx: fibromyalgia, depression, thyroid problems (controlled), asthma, appendectomy, cholescystectomy  Work Hx: None reported  Living Situation: Two story home, bedroom on second floor  Pt Goals: \"Less pain. \"  Barriers: []pain []financial []time []transportation []other  Motivation: Good  Substance use: []Alcohol []Tobacco []other:   FABQ Score: [x]low []elevate  Cognition: A & O x 4    Other: Good    OBJECTIVE/EXAMINATION  Posture: [] Varus    [] Valgus    [] Recurvatum        [] Tibial Torsion    [] Foot Supination    [] Foot Pronation    Describe: does not straighten L knee in WB    Gait:  [] Normal    [] Abnormal    [] Antalgic    [] NWB    Device:    Describe: limited L knee extension at end range    ROM / Strength  [] Unable to assess                  AROM                 Strength (1-5)    Left Right Left Right   Hip Flexion   4- 5    Extension   4- 5   Knee Flexion 137 130 5 5    Extension -2 -5 5 5   Ankle Plantarflexion   5 5    Dorsiflexion   5 5       Flexibility: [] Unable to assess at this time  Hamstrings:    (L) Tightness= [] WNL   [x] Min   [] Mod   [] Severe    (R) Tightness= [] WNL   [x] Min   [] Mod   [] Severe  Quadriceps:    (L) Tightness= [] WNL   [x] Min   [] Mod   [] Severe    (R) Tightness= [] WNL   [x] Min   [] Mod   [] Severe  Gastroc:      (L) Tightness= [] WNL   [] Min   [] Mod   [] Severe    (R) Tightness= [] WNL   [] Min   [] Mod   [] Severe  Other:    Palpation:   Neg/Pos  Neg/Pos  Neg/Pos   Joint Line Neg Quad tendon Pos L Patellar ligament Pos L   Patella Pos L Fibular head Neg Pes Anserinus Neg   Tibial tubercle Neg Hamstring tendons Neg Infrapatellar fat pad  Pos L     Optional Tests:  Patellar Positioning (Static)   []L []R Normal []L []R Lateral   []L []R Sheralyn Beech      []L []R Medial   []L []R [place]    Patellar Tracking   [x]L []R Glide (Lat)   []L []R Tilt (Lat)     []L []R Glide (Med)  []L []R Tilt (Med)      []L []R Tile (Inf)     Patellar Mobility   [x]L []R Hypermobile []L []R Hypomobile         Girth Measurements:     Cm at  Cm above joint line   Cm at   Cm below joint line  Cm at joint line   Left        Right           Lachmans  [] Neg    [] Pos Posterior Drawer [] Neg    [] Pos  Pivot Shift  [] Neg    [] Pos Posterior Sag  [] Neg    [] Pos  ANGELIA   [] Neg    [] Pos Staci's Test [] Neg    [] Pos  ALRI   [] Neg    [] Pos Squat   [] Neg    [x] Pos  Valgus@ 0 Degrees [x] Neg    [] Pos Mick-Toi [x] Neg    [] Pos  Valgus@ 30 Degrees [x] Neg    [] Pos Patellar Apprehension [] Neg    [] Pos  Varus@ 0 Degrees [x] Neg    [] Pos Elizabeth's Compression [] Neg    [] Pos  Varus@ 30 Degrees [x] Neg    [] Pos Ely's Test  [x] Neg    [] Pos  Apley's Compression [] Neg    [] Pos Azra's Test  [] Neg    [] Pos  Apley's Distraction [] Neg    [] Pos Stroke Test  [] Neg    [] Pos   Anterior Drawer [x] Neg    [] Pos Fluctuation Test [] Neg    [] Pos  Other:                  [] Neg    [] Pos                 Other tests/comments:    Poor squat mechanics, with ant knee displacement and early heel rise B  (+) B MIKE  Limited hip IR B in sitting

## 2018-01-24 NOTE — PROGRESS NOTES
PT DAILY TREATMENT NOTE 3-16    Patient Name: Dipti Rasheed  Date:2018  : 1995  [x]  Patient  Verified  Payor: BLUE CROSS / Plan: [employer] [facility] [place] / Product Type: PPO /    In time:1304  Out time:1331  Total Treatment Time (min): 27  Visit #: 1 of 6    Treatment Area: Patellar tendinitis, left knee [M76.52]    SUBJECTIVE  Pain Level (0-10 scale): 5  Any medication changes, allergies to medications, adverse drug reactions, diagnosis change, or new procedure performed?: [x] No    [] Yes (see summary sheet for update)  Subjective functional status/changes:   [] No changes reported  Mechanism of Injury:  - pt reports she was a catcher while playing softball, and that the L knee pain gradually worsened overtime. She reports the pain is worse now than it was at previous episode of treatment, but similar in complaint. Current symptoms/Complaints: Burning from patellar tendon to mid patella  Previous Treatment/Compliance: PT for L knee in     OBJECTIVE    19 min [x]Eval                  []Re-Eval     8 min Therapeutic Exercise:  [x] See flow sheet :   Rationale: increase ROM and increase strength to improve the patients ability to perform ADLs; Reviewed with pt involved anatomy and pathology, treatment plan, and goals. With   [] TE   [] TA   [] neuro   [] other: Patient Education: [x] Review HEP    [] Progressed/Changed HEP based on:   [] positioning   [] body mechanics   [] transfers   [] heat/ice application    [] other:      Other Objective/Functional Measures: see eval.     Pain Level (0-10 scale) post treatment: 5    ASSESSMENT/Changes in Function:   Pt is a 25 y.o.  Female presenting with chronic history of L knee pain, with limitations noted in limited L knee extension in WB, poor squat mechanics, L hip flex and ext MMT 4-/5, (+) TTP along L patella/quad tendon/parellar ligament/infraspinatus fat pad, L hypermobile patella, (+) B MIKE, limited B hip IR in sitting, and FOTO score of 48. Pt would benefit from skilled PT intervention to address the above listed limitations and allow improved functional task completion. Patient will continue to benefit from skilled PT services to modify and progress therapeutic interventions, address functional mobility deficits, address ROM deficits, address strength deficits, analyze and address soft tissue restrictions, analyze and cue movement patterns, analyze and modify body mechanics/ergonomics, assess and modify postural abnormalities and instruct in home and community integration to attain remaining goals. [x]  See Plan of Care  []  See progress note/recertification  []  See Discharge Summary         Progress towards goals / Updated goals:  Short Term Goals: To be accomplished in two weeks:                         1.  Pt will demonstrate L hip flex and ext MMT to 5/5 to improve position changes with ADLs. 2.  Pt will demonstrate full L knee ext in static standing to improve gait mechanics. Long Term Goals: To be accomplished in three weeks:                         1.  Pt will demonstrate FOTO to 64 or greater to indicate improved functional task completion. 2.  Pt will demonstrate 10x air squats with proper form to allow return to lifting at the gym. 3.  Pt will report ability to walk two blocks or more without difficulty to return to community ambulation.     PLAN  [x]  Upgrade activities as tolerated     [x]  Continue plan of care  [x]  Update interventions per flow sheet       []  Discharge due to:_  []  Other:_      Jon Albarran PT 1/24/2018  1:41 PM    Future Appointments  Date Time Provider Adelso Louise   1/29/2018 11:00 AM Jon Albarran PT [facility]   1/31/2018 10:30 AM Vicky Hart [facility]   2/5/2018 10:30 AM [id number] [employer] [facility]   2/6/2018 9:30 AM MD LUCIE Payne RUT SCHED   2/7/2018 10:30 AM [id number] [address] [employer] [facility]   2/12/2018 11:00 AM Ezequiel Simmons PT [facility]   2/20/2018 10:20 AM MD Cornelius Nelson

## 2018-01-29 ENCOUNTER — HOSPITAL ENCOUNTER (OUTPATIENT)
Dept: PHYSICAL THERAPY | Age: 23
Discharge: HOME | End: 2018-01-29
Payer: COMMERCIAL

## 2018-01-29 PROCEDURE — 97112 NEUROMUSCULAR REEDUCATION: CPT

## 2018-01-29 PROCEDURE — 97110 THERAPEUTIC EXERCISES: CPT

## 2018-01-29 NOTE — PROGRESS NOTES
PT DAILY TREATMENT NOTE 3-16    Patient Name: Dipti Rasheed  Date:2018  : 1995  [x]  Patient  Verified  Payor: BLUE CROSS / Plan: [employer] [facility] / Product Type: PPO /    In time:1101  Out time:1129  Total Treatment Time (min): 28  Visit #: 2 of 6    Treatment Area: Patellar tendinitis, left knee [M76.52]    SUBJECTIVE  Pain Level (0-10 scale): 2  Any medication changes, allergies to medications, adverse drug reactions, diagnosis change, or new procedure performed?: [x] No    [] Yes (see summary sheet for update)  Subjective functional status/changes:   [] No changes reported  Pt reports some soreness. OBJECTIVE     min []Eval                  []Re-Eval     20 min Therapeutic Exercise:  [x] See flow sheet :   Rationale: increase ROM and increase strength to improve the patients ability to perform ADLs    8 min Neuromuscular Re-education:  [x]  See flow sheet :   Rationale: increase strength, improve coordination and increase proprioception  to improve the patients ability to improve core and lumbopelvic stability to decrease knee pain            With   [] TE   [] TA   [] neuro   [] other: Patient Education: [x] Review HEP    [] Progressed/Changed HEP based on:   [] positioning   [] body mechanics   [] transfers   [] heat/ice application    [] other:      Other Objective/Functional Measures:   Unable to perform inchworms and world's greatest stretch due to complaints of knee pain     Pain Level (0-10 scale) post treatment: 2    ASSESSMENT/Changes in Function:   Pt reports some pain along L knee when trying to perform dynamic warm up. She was able to perform squat to target without any pain reported, and added to HEP 2x/day, 5 reps at a time, focusing on knee position and no pain.     Patient will continue to benefit from skilled PT services to modify and progress therapeutic interventions, address functional mobility deficits, address ROM deficits, address strength deficits, analyze and address soft tissue restrictions, analyze and cue movement patterns, analyze and modify body mechanics/ergonomics, assess and modify postural abnormalities and instruct in home and community integration to attain remaining goals. []  See Plan of Care  []  See progress note/recertification  []  See Discharge Summary         Progress towards goals / Updated goals:  Short Term Goals: To be accomplished in two weeks:                         0.  Pt will demonstrate L hip flex and ext MMT to 5/5 to improve position changes with ADLs. Initiated strengthening 1/29/2018                         2.  Pt will demonstrate full L knee ext in static standing to improve gait mechanics. Long Term Goals: To be accomplished in three weeks:                         5.  Pt will demonstrate FOTO to 59 or greater to indicate improved functional task completion.                         4.  Pt will demonstrate 10x air squats with proper form to allow return to lifting at the gym.                         1.  Pt will report ability to walk two blocks or more without difficulty to return to community ambulation.     PLAN  [x]  Upgrade activities as tolerated     [x]  Continue plan of care  [x]  Update interventions per flow sheet       []  Discharge due to:_  []  Other:_      Mark Jauregui, PT 1/29/2018  11:05 AM    Future Appointments  Date Time Provider Adelso Louise   1/31/2018 10:30 AM [id number] [facility] [employer] [facility]   2/5/2018 10:30 AM [address] [address] [employer] [facility]   2/6/2018 9:30 AM MD Yu Dailye Pert SCHED   2/7/2018 10:30 AM Evonnie Denver [facility]   2/12/2018 11:00 AM Mark Jauregui PT [facility]   2/20/2018 10:20 AM Francisco Keyes MD [facility] 69

## 2018-01-31 ENCOUNTER — HOSPITAL ENCOUNTER (OUTPATIENT)
Dept: PHYSICAL THERAPY | Age: 23
End: 2018-01-31
Payer: COMMERCIAL

## 2018-02-01 ENCOUNTER — HOSPITAL ENCOUNTER (OUTPATIENT)
Dept: PHYSICAL THERAPY | Age: 23
Discharge: HOME | End: 2018-02-01
Payer: COMMERCIAL

## 2018-02-01 PROCEDURE — 97112 NEUROMUSCULAR REEDUCATION: CPT

## 2018-02-01 NOTE — PROGRESS NOTES
PT DAILY TREATMENT NOTE     Patient Name: Viola Morel  Date:2018  : 1995  [x]  Patient  Verified  Payor: BLUE CROSS / Plan: [employer] [facility] [place] / Product Type: PPO /    In time:8:30  Out time:9:06  Total Treatment Time (min): 36  1:1 Time: 17  Visit #: 3 of 8    Treatment Area: Patellar tendinitis, left knee [M76.52]    SUBJECTIVE  Pain Level (0-10 scale): 0  Any medication changes, allergies to medications, adverse drug reactions, diagnosis change, or new procedure performed?: [x] No    [] Yes (see summary sheet for update)  Subjective functional status/changes:   [] No changes reported  \"it was a little better this morning    OBJECTIVE    Modality rationale: PD to improve the patients ability to    Min Type Additional Details    [] Estim:  []Unatt       []IFC  []Premod                        []Other:  []w/ice   []w/heat  Position:  Location:    [] Estim: []Att    []TENS instruct  []NMES                    []Other:  []w/US   []w/ice   []w/heat  Position:  Location:    []  Traction: [] Cervical       []Lumbar                       [] Prone          []Supine                       []Intermittent   []Continuous Lbs:  [] before manual  [] after manual    []  Ultrasound: []Continuous   [] Pulsed                           []1MHz   []3MHz W/cm2:  Location:    []  Iontophoresis with dexamethasone         Location: [] Take home patch   [] In clinic    []  Ice     []  heat  []  Ice massage  []  Laser   []  Anodyne Position:  Location:    []  Laser with stim  []  Other:  Position:  Location:    []  Vasopneumatic Device Pressure:       [] lo [] med [] hi   Temperature: [] lo [] med [] hi   [] Skin assessment post-treatment:  []intact []redness- no adverse reaction    []redness  adverse reaction:         12 min Therapeutic Exercise:  [] See flow sheet :   Rationale: increase ROM and increase strength to improve the patients ability to perform daily tasks    24 min Neuromuscular Re-education:  [] See flow sheet :   Rationale: increase strength and improve coordination  to improve the patients ability to improve glute strength and motor control           With   [] TE   [] TA   [] neuro   [] other: Patient Education: [x] Review HEP    [] Progressed/Changed HEP based on:   [] positioning   [] body mechanics   [] transfers   [] heat/ice application    [] other:      Other Objective/Functional Measures: pt reports some increased pain with squats to chair today, added Airex for decreased range with improvement in symptoms     Pain Level (0-10 scale) post treatment: 0    ASSESSMENT/Changes in Function: Pt with good tolerance to therex today reporting discomfort only with squats to target, improved after modifying range. Progress hip strength and functional mechanics. Patient will continue to benefit from skilled PT services to modify and progress therapeutic interventions, address functional mobility deficits, address ROM deficits, address strength deficits, analyze and address soft tissue restrictions, analyze and cue movement patterns and analyze and modify body mechanics/ergonomics to attain remaining goals. []  See Plan of Care  []  See progress note/recertification  []  See Discharge Summary         Progress towards goals / Updated goals:  Short Term Goals: To be accomplished in two weeks:                         5.  Pt will demonstrate L hip flex and ext MMT to 5/5 to improve position changes with ADLs. Initiated strengthening 1/29/2018                         2.  Pt will demonstrate full L knee ext in static standing to improve gait mechanics.  Met post session today 2/1/18  Long Term Goals: To be accomplished in three weeks:                         1.  Pt will demonstrate FOTO to 64 or greater to indicate improved functional task completion.                         2.  Pt will demonstrate 10x air squats with proper form to allow return to lifting at the gym.                         0.  Pt will report ability to walk two blocks or more without difficulty to return to community ambulation.     PLAN  [x]  Upgrade activities as tolerated     []  Continue plan of care  []  Update interventions per flow sheet       []  Discharge due to:_  []  Other:_      Marisela Prather 2/1/2018  8:51 AM    Future Appointments  Date Time Provider Adelso Louise   2/5/2018 10:30 AM [id number] [facility]   2/6/2018 9:30 AM Markus Chen MD [facility]   2/7/2018 10:30 AM Marisela Prather [facility]   2/12/2018 11:00 AM Nelida Singh, PT [facility]   2/20/2018 10:20 AM Carol Ramos MD [place] [id number]

## 2018-02-05 ENCOUNTER — HOSPITAL ENCOUNTER (OUTPATIENT)
Dept: PHYSICAL THERAPY | Age: 23
Discharge: HOME | End: 2018-02-05
Payer: COMMERCIAL

## 2018-02-05 PROCEDURE — 97112 NEUROMUSCULAR REEDUCATION: CPT

## 2018-02-05 PROCEDURE — 97110 THERAPEUTIC EXERCISES: CPT

## 2018-02-05 NOTE — PROGRESS NOTES
PT DAILY TREATMENT NOTE     Patient Name: Yuni Varner  Date:2018  : 1995  [x]  Patient  Verified  Payor: BLUE CROSS / Plan: [employer] [facility] [place] / Product Type: PPO /    In time:10:30  Out time:11:04  Total Treatment Time (min): 34  1:1 Time: 25  Visit #: 4 of 8    Treatment Area: Patellar tendinitis, left knee [M76.52]    SUBJECTIVE  Pain Level (0-10 scale): 0  Any medication changes, allergies to medications, adverse drug reactions, diagnosis change, or new procedure performed?: [x] No    [] Yes (see summary sheet for update)  Subjective functional status/changes:   [] No changes reported  \"It felt ok this morning\" Pt reports her knee has been doing well since last session    OBJECTIVE    24 min Therapeutic Exercise:  [] See flow sheet :   Rationale: increase ROM and increase strength to improve the patients ability to perform daily tasks    10 min Neuromuscular Re-education:  []  See flow sheet :    Rationale: increase strength and improve coordination  to improve the patients ability to recruit glutes for stability in standing          With   [] TE   [] TA   [] neuro   [] other: Patient Education: [x] Review HEP    [] Progressed/Changed HEP based on:   [] positioning   [] body mechanics   [] transfers   [] heat/ice application    [] other:      Other Objective/Functional Measures: slight valgus at bottom of squat to return to upright, pt able to squat to chair today without pain    L hip flex and EXT MMT 5/5     Pain Level (0-10 scale) post treatment: 0    ASSESSMENT/Changes in Function: Pt with well controlled pain levels at this time. Some discomfort with attempted progression to 6\" step downs. Continue to progress quad and hip strength as tolerated. Discussed with pt trial return to gym next week in light capacity if still feeling well.     Patient will continue to benefit from skilled PT services to modify and progress therapeutic interventions, address functional mobility deficits, address ROM deficits, address strength deficits, analyze and address soft tissue restrictions, analyze and cue movement patterns and analyze and modify body mechanics/ergonomics to attain remaining goals. []  See Plan of Care  []  See progress note/recertification  []  See Discharge Summary         Progress towards goals / Updated goals:  Short Term Goals: To be accomplished in two weeks:                         5.  Pt will demonstrate L hip flex and ext MMT to 5/5 to improve position changes with ADLs. Initiated strengthening 1/29/2018 Met 2/5/18                         2.  Pt will demonstrate full L knee ext in static standing to improve gait mechanics. Met post session today 2/1/18  Long Term Goals: To be accomplished in three weeks:                         1.  Pt will demonstrate FOTO to 64 or greater to indicate improved functional task completion.                         8.  Pt will demonstrate 10x air squats with proper form to allow return to lifting at the gym.                         2.  Pt will report ability to walk two blocks or more without difficulty to return to community ambulation.     PLAN  []  Upgrade activities as tolerated     []  Continue plan of care  []  Update interventions per flow sheet       []  Discharge due to:_  []  Other:_      Garry Roman CMTPT 2/5/2018  10:45 AM    Future Appointments  Date Time Provider Adelso Lunai   2/6/2018 9:30 AM Riley Dill MD [id number]   2/7/2018 10:30 AM [address] [facility] [facility]   2/12/2018 11:00 [facility]   2/20/2018 10:20 AM Diaz Armando MD [facility] 75

## 2018-02-07 ENCOUNTER — HOSPITAL ENCOUNTER (OUTPATIENT)
Dept: PHYSICAL THERAPY | Age: 23
Discharge: HOME | End: 2018-02-07
Payer: COMMERCIAL

## 2018-02-07 PROCEDURE — 97112 NEUROMUSCULAR REEDUCATION: CPT

## 2018-02-07 PROCEDURE — 97110 THERAPEUTIC EXERCISES: CPT

## 2018-02-07 NOTE — PROGRESS NOTES
PT DAILY TREATMENT NOTE     Patient Name: Teresa Riddle  Date:2018  : 1995  [x]  Patient  Verified  Payor: BLUE CROSS / Plan: "[employer] [facility] [place] / Product Type: PPO /    In time:10:30  Out time:11:02  Total Treatment Time (min): 32  1:1 Time: 24   Visit #: 5 of 8    Treatment Area: Patellar tendinitis, left knee [M76.52]    SUBJECTIVE  Pain Level (0-10 scale): 0  Any medication changes, allergies to medications, adverse drug reactions, diagnosis change, or new procedure performed?: [x] No    [] Yes (see summary sheet for update)  Subjective functional status/changes:   [] No changes reported  Pt reports no pain at start of session, she does report pain from elliptical performance    OBJECTIVE    Modality rationale: PD to improve the patients ability to    Min Type Additional Details    [] Estim:  []Unatt       []IFC  []Premod                        []Other:  []w/ice   []w/heat  Position:  Location:    [] Estim: []Att    []TENS instruct  []NMES                    []Other:  []w/US   []w/ice   []w/heat  Position:  Location:    []  Traction: [] Cervical       []Lumbar                       [] Prone          []Supine                       []Intermittent   []Continuous Lbs:  [] before manual  [] after manual    []  Ultrasound: []Continuous   [] Pulsed                           []1MHz   []3MHz W/cm2:  Location:    []  Iontophoresis with dexamethasone         Location: [] Take home patch   [] In clinic    []  Ice     []  heat  []  Ice massage  []  Laser   []  Anodyne Position:  Location:    []  Laser with stim  []  Other:  Position:  Location:    []  Vasopneumatic Device Pressure:       [] lo [] med [] hi   Temperature: [] lo [] med [] hi   [] Skin assessment post-treatment:  []intact []redness- no adverse reaction    []redness  adverse reaction:     22 min Therapeutic Exercise:  [] See flow sheet :   Rationale: increase ROM and increase strength to improve the patients ability to perform daily tasks and ADls    10 min Neuromuscular Re-education:  []  See flow sheet :   Rationale: increase strength and increase proprioception  to improve the patients ability to improve glute stability and motor control            With   [] TE   [] TA   [] neuro   [] other: Patient Education: [x] Review HEP    [] Progressed/Changed HEP based on:   [] positioning   [] body mechanics   [] transfers   [] heat/ice application    [] other:      Other Objective/Functional Measures: added SB bridges today for posterior chain strength, adjusted 2nd position to 45 deg vs. 90 deg     Pain Level (0-10 scale) post treatment: 2    ASSESSMENT/Changes in Function: Pt reports increased knee pain with performance of elliptical today. Lingering soreness throughout the session, thus held step ups and downs today. Progressing well with glute strengthening, good squat form noted as well. Progress interventions next session as tolerated. Patient will continue to benefit from skilled PT services to modify and progress therapeutic interventions, address functional mobility deficits, address ROM deficits, address strength deficits, analyze and address soft tissue restrictions, analyze and cue movement patterns and analyze and modify body mechanics/ergonomics to attain remaining goals. []  See Plan of Care  []  See progress note/recertification  []  See Discharge Summary         Progress towards goals / Updated goals:  Short Term Goals: To be accomplished in two weeks:                         6.  Pt will demonstrate L hip flex and ext MMT to 5/5 to improve position changes with ADLs. Initiated strengthening 1/29/2018 Met 2/5/18                         2.  Pt will demonstrate full L knee ext in static standing to improve gait mechanics. Met post session today 2/1/18  Long Term Goals: To be accomplished in three weeks:                         1.  Pt will demonstrate FOTO to 64 or greater to indicate improved functional task completion. Decreased 3 points to 45 2/7/18                         2.  Pt will demonstrate 10x air squats with proper form to allow return to lifting at the gym. Able to perform 10 squats with good form and no cues, some pain present though 2/7/18                         3.  Pt will report ability to walk two blocks or more without difficulty to return to community ambulation.     PLAN  [x]  Upgrade activities as tolerated     []  Continue plan of care  []  Update interventions per flow sheet       []  Discharge due to:_  []  Other:_      Julia Espinoza DPT, CMTPT 2/7/2018  10:35 AM    Future Appointments  Date Time Provider Adelso Louise   2/12/2018 11:00 AM Valerio Richardson, PT [facility]   2/20/2018 10:20 AM Xi Pandya MD [place] [id number]

## 2018-02-12 ENCOUNTER — APPOINTMENT (OUTPATIENT)
Dept: PHYSICAL THERAPY | Age: 23
End: 2018-02-12
Payer: COMMERCIAL

## 2018-02-16 ENCOUNTER — HOSPITAL ENCOUNTER (OUTPATIENT)
Dept: PHYSICAL THERAPY | Age: 23
Discharge: HOME | End: 2018-02-16
Payer: COMMERCIAL

## 2018-02-16 PROCEDURE — 97110 THERAPEUTIC EXERCISES: CPT

## 2018-02-16 PROCEDURE — 97112 NEUROMUSCULAR REEDUCATION: CPT

## 2018-02-16 NOTE — PROGRESS NOTES
PT DAILY TREATMENT NOTE     Patient Name: Grzegorz Counter  Date:2018  : 1995  [x]  Patient  Verified  Payor: BLUE CROSS / Plan: [employer] [facility] / Product Type: PPO /    In time:11:30  Out time:12:01  Total Treatment Time (min): 31  1:1 Time: 31  Visit #: 6 of 8    Treatment Area: Patellar tendinitis, left knee [M76.52]    SUBJECTIVE  Pain Level (0-10 scale): 5  Any medication changes, allergies to medications, adverse drug reactions, diagnosis change, or new procedure performed?: [x] No    [] Yes (see summary sheet for update)  Subjective functional status/changes:   [] No changes reported  Pt reports she banged     OBJECTIVE    Modality rationale: PD to improve the patients ability to    Min Type Additional Details    [] Estim:  []Unatt       []IFC  []Premod                        []Other:  []w/ice   []w/heat  Position:  Location:    [] Estim: []Att    []TENS instruct  []NMES                    []Other:  []w/US   []w/ice   []w/heat  Position:  Location:    []  Traction: [] Cervical       []Lumbar                       [] Prone          []Supine                       []Intermittent   []Continuous Lbs:  [] before manual  [] after manual    []  Ultrasound: []Continuous   [] Pulsed                           []1MHz   []3MHz W/cm2:  Location:    []  Iontophoresis with dexamethasone         Location: [] Take home patch   [] In clinic    []  Ice     []  heat  []  Ice massage  []  Laser   []  Anodyne Position:  Location:    []  Laser with stim  []  Other:  Position:  Location:    []  Vasopneumatic Device Pressure:       [] lo [] med [] hi   Temperature: [] lo [] med [] hi   [] Skin assessment post-treatment:  []intact []redness- no adverse reaction    []redness  adverse reaction:       21 min Therapeutic Exercise:  [] See flow sheet :   Rationale: increase ROM and increase strength to improve the patients ability to perform daily tasks    10 min Neuromuscular Re-education:  []  See flow sheet :   Rationale: increase strength and improve coordination  to improve the patients glute activation and motor control        With   [] TE   [] TA   [] neuro   [] other: Patient Education: [x] Review HEP    [] Progressed/Changed HEP based on:   [] positioning   [] body mechanics   [] transfers   [] heat/ice application    [] other:      Other Objective/Functional Measures: good tolerance to all non-WB'ing interventions     Pain Level (0-10 scale) post treatment: 5    ASSESSMENT/Changes in Function: pt with increased pain levels today, states she banged her knee on a table this morning. Visible discomfort noted with many interventions, modified as necessary. Pt wishes to hold further PT until completing her MD f/u on 2/20. Patient will continue to benefit from skilled PT services to modify and progress therapeutic interventions, address functional mobility deficits, address ROM deficits, address strength deficits, analyze and address soft tissue restrictions, analyze and cue movement patterns and analyze and modify body mechanics/ergonomics to attain remaining goals. []  See Plan of Care  []  See progress note/recertification  []  See Discharge Summary         Progress towards goals / Updated goals:  Short Term Goals: To be accomplished in two weeks:                         6.  Pt will demonstrate L hip flex and ext MMT to 5/5 to improve position changes with ADLs. Initiated strengthening 1/29/2018 Met 2/5/18                         2.  Pt will demonstrate full L knee ext in static standing to improve gait mechanics. Met post session today 2/1/18  Long Term Goals: To be accomplished in three weeks:                         1.  Pt will demonstrate FOTO to 64 or greater to indicate improved functional task completion. Decreased 3 points to 45 2/7/18                         2.  Pt will demonstrate 10x air squats with proper form to allow return to lifting at the gym.  Able to perform 10 squats with good form and no cues, some pain present though 2/7/18                         3.  Pt will report ability to walk two blocks or more without difficulty to return to community ambulation.  Not met- unchanged 2/16/18    PLAN  []  Upgrade activities as tolerated     []  Continue plan of care  []  Update interventions per flow sheet       []  Discharge due to:_  []  Other:_      Garlan Soulier, DPT, CMTPT 2/16/2018  11:31 AM    Future Appointments  Date Time Provider Adelso Louise   2/20/2018 10:20 AM Dawson Wyatt MD [facility] 70

## 2018-02-20 ENCOUNTER — OFFICE VISIT (OUTPATIENT)
Dept: ORTHOPEDIC SURGERY | Age: 23
End: 2018-02-20

## 2018-02-20 VITALS
BODY MASS INDEX: 43.47 KG/M2 | OXYGEN SATURATION: 99 % | DIASTOLIC BLOOD PRESSURE: 71 MMHG | WEIGHT: 236.2 LBS | HEIGHT: 62 IN | SYSTOLIC BLOOD PRESSURE: 116 MMHG | HEART RATE: 89 BPM

## 2018-02-20 DIAGNOSIS — E66.01 OBESITY, MORBID, BMI 40.0-49.9 (HCC): ICD-10-CM

## 2018-02-20 DIAGNOSIS — M76.52 PATELLAR TENDINITIS, LEFT KNEE: Primary | ICD-10-CM

## 2018-02-20 NOTE — PROGRESS NOTES
Ameya Olivas  1995   Chief Complaint   Patient presents with    Knee Injury     left        HISTORY OF PRESENT ILLNESS  Ameya Olivas is a 26 y.o. female who presents today for reevaluation of left knee pain. Patient rates pain as 0/10 today. Patient has finished PT and is now doing a HEP. At last OV, she was given a prescription for Mobic. Reports feeling better and making progress. Her hamstring tightness has decreased. Patient denies any fever, chills, chest pain, shortness of breath or calf pain. There are no new illness or injuries to report since last seen in the office. There are no changes to medications, allergies, family or social history. PHYSICAL EXAM:   Visit Vitals    /71 (BP 1 Location: Left arm)    Pulse 89    Ht 5' 2\" (1.575 m)    Wt 236 lb 3.2 oz (107.1 kg)    SpO2 99%    BMI 43.2 kg/m2     The patient is a well-developed, well-nourished female   in no acute distress. The patient is alert and oriented times three. The patient is alert and oriented times three. Mood and affect are normal.  LYMPHATIC: lymph nodes are not enlarged and are within normal limits  SKIN: normal in color and non tender to palpation. There are no bruises or abrasions noted. NEUROLOGICAL: Motor sensory exam is within normal limits. Reflexes are equal bilaterally.  There is normal sensation to pinprick and light touch  MUSCULOSKELETAL:  Examination Left knee   Skin Intact   Range of motion 0-130   Effusion -   Medial joint line tenderness -   Lateral joint line tenderness -   Tenderness Pes Bursa -   Tenderness insertion MCL -   Tenderness insertion LCL -   Micks -   Patella crepitus -   Patella grind -   Lachman -   Pivot shift -   Anterior drawer -   Posterior drawer -   Varus stress -   Valgus stress -   Neurovascular Intact   Calf Swelling and Tenderness to Palpation -   Steve's Test -   Hamstring Cord Tightness +   Patella tenderness    IMAGING: XR of the left knee dated 1/23/18 was reviewed and read: normal      IMPRESSION:      ICD-10-CM ICD-9-CM    1. Patellar tendinitis, left knee M76.52 726.64    2. Obesity, morbid, BMI 40.0-49.9 ([facility]) E66.01 278.01         PLAN:   1. I am pleased that she is making progress with the therapy and HEP at this time. Instructed to continue working on hamstring stretches and to establish a conditioning routine. Counseled on weight management. Risk factors include: BMI>40  2. No cortisone injection indicated today   3. No Physical/Occupational Therapy indicated today  4. No diagnostic test indicated today  5. No durable medical equipment indicated today  6. No referral indicated today   7. No medications indicated today  8. No Narcotic indicated today       RTC prn  Follow-up Disposition: Not on File    Scribed by Elroy Harrington [facility]) as dictated by Coco Grimes MD    I, Dr. Coco Grimes, confirm that all documentation is accurate.     Coco Grimes M.D.   [facility]

## 2018-03-13 NOTE — PROGRESS NOTES
[facility]  [address]  [place], [address]  [phone] fax    Physical Therapy Discharge Summary  Patient name: Sandi Cochran Start of Care: 2018   Referral source: Martir Salter,* : 1995                          Medical Diagnosis: Patellar tendinitis, left knee [M76.52] Onset Date:                          Treatment Diagnosis: L knee pain   Prior Hospitalization: see medical history Provider#: [id number]   Medications: Verified on Patient summary List    Comorbidities: fibromyalgia, depression, thyroid problems (controlled), asthma, appendectomy, cholescystectomy   Prior Level of Function: , goes to the gym but with reports of knee pain  Visits from Start of Care: 6    Missed Visits: 0  Reporting Period : 2018 to 2018      Summary of Care:  Short Term Goals: To be accomplished in two weeks:                         1.  Pt will demonstrate L hip flex and ext MMT to 5/5 to improve position changes with ADLs. Initiated strengthening 2018 Met 18                         2.  Pt will demonstrate full L knee ext in static standing to improve gait mechanics. Met post session today 18  Long Term Goals: To be accomplished in three weeks:                         1.  Pt will demonstrate FOTO to 64 or greater to indicate improved functional task completion. Decreased 3 points to 45 18                         2.  Pt will demonstrate 10x air squats with proper form to allow return to lifting at the gym. Able to perform 10 squats with good form and no cues, some pain present though 18                         3.  Pt will report ability to walk two blocks or more without difficulty to return to community ambulation. Not met- unchanged 18    ASSESSMENT/RECOMMENDATIONS: Pt requests discharge. No further update toward goals or instruction given.   [x]Discontinue therapy: []Patient has reached or is progressing toward set goals      [x]Patient is non-compliant or has abdicated      []Due to lack of appreciable progress towards set goals    Aaron Ojeda, PT 3/13/2018 2:10 PM

## 2018-03-16 ENCOUNTER — HOSPITAL ENCOUNTER (EMERGENCY)
Age: 23
Discharge: HOME | End: 2018-03-16
Attending: EMERGENCY MEDICINE
Payer: COMMERCIAL

## 2018-03-16 VITALS
SYSTOLIC BLOOD PRESSURE: 97 MMHG | TEMPERATURE: 98.4 F | DIASTOLIC BLOOD PRESSURE: 68 MMHG | OXYGEN SATURATION: 100 % | RESPIRATION RATE: 18 BRPM | HEIGHT: 63 IN | BODY MASS INDEX: 41.11 KG/M2 | HEART RATE: 96 BPM | WEIGHT: 232 LBS

## 2018-03-16 DIAGNOSIS — R10.9 CHRONIC ABDOMINAL PAIN: ICD-10-CM

## 2018-03-16 DIAGNOSIS — G89.29 CHRONIC ABDOMINAL PAIN: ICD-10-CM

## 2018-03-16 DIAGNOSIS — R11.0 NAUSEA WITHOUT VOMITING: ICD-10-CM

## 2018-03-16 DIAGNOSIS — N39.0 ACUTE UTI: Primary | ICD-10-CM

## 2018-03-16 LAB
ALBUMIN SERPL-MCNC: 3.9 G/DL (ref 3.4–5)
ALBUMIN/GLOB SERPL: 1.2 {RATIO} (ref 0.8–1.7)
ALP SERPL-CCNC: 113 U/L (ref 45–117)
ALT SERPL-CCNC: 21 U/L (ref 13–56)
ANION GAP SERPL CALC-SCNC: 9 MMOL/L (ref 3–18)
APPEARANCE UR: ABNORMAL
AST SERPL-CCNC: 15 U/L (ref 15–37)
BACTERIA URNS QL MICRO: ABNORMAL /HPF
BASOPHILS # BLD: 0 K/UL (ref 0–0.06)
BASOPHILS NFR BLD: 0 % (ref 0–2)
BILIRUB SERPL-MCNC: 0.3 MG/DL (ref 0.2–1)
BILIRUB UR QL: NEGATIVE
BUN SERPL-MCNC: 9 MG/DL (ref 7–18)
BUN/CREAT SERPL: 13 (ref 12–20)
CALCIUM SERPL-MCNC: 9.4 MG/DL (ref 8.5–10.1)
CHLORIDE SERPL-SCNC: 104 MMOL/L (ref 100–108)
CO2 SERPL-SCNC: 29 MMOL/L (ref 21–32)
COLOR UR: YELLOW
CREAT SERPL-MCNC: 0.71 MG/DL (ref 0.6–1.3)
DIFFERENTIAL METHOD BLD: ABNORMAL
EOSINOPHIL # BLD: 0.1 K/UL (ref 0–0.4)
EOSINOPHIL NFR BLD: 1 % (ref 0–5)
EPITH CASTS URNS QL MICRO: ABNORMAL /LPF (ref 0–5)
ERYTHROCYTE [DISTWIDTH] IN BLOOD BY AUTOMATED COUNT: 17.4 % (ref 11.6–14.5)
GLOBULIN SER CALC-MCNC: 3.3 G/DL (ref 2–4)
GLUCOSE SERPL-MCNC: 96 MG/DL (ref 74–99)
GLUCOSE UR STRIP.AUTO-MCNC: NEGATIVE MG/DL
HCG UR QL: NEGATIVE
HCT VFR BLD AUTO: 39.5 % (ref 35–45)
HGB BLD-MCNC: 12.3 G/DL (ref 12–16)
HGB UR QL STRIP: ABNORMAL
KETONES UR QL STRIP.AUTO: NEGATIVE MG/DL
LEUKOCYTE ESTERASE UR QL STRIP.AUTO: ABNORMAL
LIPASE SERPL-CCNC: 135 U/L (ref 73–393)
LYMPHOCYTES # BLD: 2.4 K/UL (ref 0.9–3.6)
LYMPHOCYTES NFR BLD: 17 % (ref 21–52)
MCH RBC QN AUTO: 24 PG (ref 24–34)
MCHC RBC AUTO-ENTMCNC: 31.1 G/DL (ref 31–37)
MCV RBC AUTO: 77.1 FL (ref 74–97)
MONOCYTES # BLD: 0.7 K/UL (ref 0.05–1.2)
MONOCYTES NFR BLD: 5 % (ref 3–10)
NEUTS SEG # BLD: 11.2 K/UL (ref 1.8–8)
NEUTS SEG NFR BLD: 77 % (ref 40–73)
NITRITE UR QL STRIP.AUTO: NEGATIVE
PH UR STRIP: 5.5 [PH] (ref 5–8)
PLATELET # BLD AUTO: 313 K/UL (ref 135–420)
PMV BLD AUTO: 10.8 FL (ref 9.2–11.8)
POTASSIUM SERPL-SCNC: 3.8 MMOL/L (ref 3.5–5.5)
PROT SERPL-MCNC: 7.2 G/DL (ref 6.4–8.2)
PROT UR STRIP-MCNC: NEGATIVE MG/DL
RBC # BLD AUTO: 5.12 M/UL (ref 4.2–5.3)
RBC #/AREA URNS HPF: ABNORMAL /HPF (ref 0–5)
SODIUM SERPL-SCNC: 142 MMOL/L (ref 136–145)
SP GR UR REFRACTOMETRY: 1.02 (ref 1–1.03)
UROBILINOGEN UR QL STRIP.AUTO: 0.2 EU/DL (ref 0.2–1)
WBC # BLD AUTO: 14.4 K/UL (ref 4.6–13.2)
WBC URNS QL MICRO: ABNORMAL /HPF (ref 0–4)

## 2018-03-16 PROCEDURE — 74011250636 HC RX REV CODE- 250/636: Performed by: PHYSICIAN ASSISTANT

## 2018-03-16 PROCEDURE — 85025 COMPLETE CBC W/AUTO DIFF WBC: CPT | Performed by: PHYSICIAN ASSISTANT

## 2018-03-16 PROCEDURE — 83690 ASSAY OF LIPASE: CPT | Performed by: PHYSICIAN ASSISTANT

## 2018-03-16 PROCEDURE — 81025 URINE PREGNANCY TEST: CPT | Performed by: PHYSICIAN ASSISTANT

## 2018-03-16 PROCEDURE — 87086 URINE CULTURE/COLONY COUNT: CPT | Performed by: PHYSICIAN ASSISTANT

## 2018-03-16 PROCEDURE — 81001 URINALYSIS AUTO W/SCOPE: CPT | Performed by: PHYSICIAN ASSISTANT

## 2018-03-16 PROCEDURE — 99283 EMERGENCY DEPT VISIT LOW MDM: CPT

## 2018-03-16 PROCEDURE — 96375 TX/PRO/DX INJ NEW DRUG ADDON: CPT

## 2018-03-16 PROCEDURE — 80053 COMPREHEN METABOLIC PANEL: CPT | Performed by: PHYSICIAN ASSISTANT

## 2018-03-16 PROCEDURE — 96374 THER/PROPH/DIAG INJ IV PUSH: CPT

## 2018-03-16 RX ORDER — KETOROLAC TROMETHAMINE 30 MG/ML
30 INJECTION, SOLUTION INTRAMUSCULAR; INTRAVENOUS
Status: COMPLETED | OUTPATIENT
Start: 2018-03-16 | End: 2018-03-16

## 2018-03-16 RX ORDER — ONDANSETRON 4 MG/1
TABLET, ORALLY DISINTEGRATING ORAL
Qty: 10 TAB | Refills: 0 | Status: SHIPPED | OUTPATIENT
Start: 2018-03-16 | End: 2018-09-24

## 2018-03-16 RX ORDER — TRAMADOL HYDROCHLORIDE 50 MG/1
50 TABLET ORAL
Qty: 6 TAB | Refills: 0 | Status: SHIPPED | OUTPATIENT
Start: 2018-03-16 | End: 2018-09-24

## 2018-03-16 RX ORDER — ONDANSETRON 2 MG/ML
4 INJECTION INTRAMUSCULAR; INTRAVENOUS
Status: COMPLETED | OUTPATIENT
Start: 2018-03-16 | End: 2018-03-16

## 2018-03-16 RX ORDER — NITROFURANTOIN 25; 75 MG/1; MG/1
100 CAPSULE ORAL 2 TIMES DAILY
Qty: 10 CAP | Refills: 0 | Status: SHIPPED | OUTPATIENT
Start: 2018-03-16 | End: 2018-03-21

## 2018-03-16 RX ADMIN — ONDANSETRON 4 MG: 2 INJECTION INTRAMUSCULAR; INTRAVENOUS at 15:51

## 2018-03-16 RX ADMIN — KETOROLAC TROMETHAMINE 30 MG: 30 INJECTION, SOLUTION INTRAMUSCULAR; INTRAVENOUS at 15:51

## 2018-03-16 NOTE — ED NOTES
Written and verbal discharge instructions given. Patient verbalizes understanding of same. Patient denies  further questions about treatment and discharge instructions. Left ED with patent airway and steady gait. Arm band removed shredded.  Patient left ED with one RX

## 2018-03-16 NOTE — DISCHARGE INSTRUCTIONS
Urinary Tract Infection in Women: Care Instructions  Your Care Instructions    A urinary tract infection, or UTI, is a general term for an infection anywhere between the kidneys and the urethra (where urine comes out). Most UTIs are bladder infections. They often cause pain or burning when you urinate. UTIs are caused by bacteria and can be cured with antibiotics. Be sure to complete your treatment so that the infection goes away. Follow-up care is a key part of your treatment and safety. Be sure to make and go to all appointments, and call your doctor if you are having problems. It's also a good idea to know your test results and keep a list of the medicines you take. How can you care for yourself at home? · Take your antibiotics as directed. Do not stop taking them just because you feel better. You need to take the full course of antibiotics. · Drink extra water and other fluids for the next day or two. This may help wash out the bacteria that are causing the infection. (If you have kidney, heart, or liver disease and have to limit fluids, talk with your doctor before you increase your fluid intake.)  · Avoid drinks that are carbonated or have caffeine. They can irritate the bladder. · Urinate often. Try to empty your bladder each time. · To relieve pain, take a hot bath or lay a heating pad set on low over your lower belly or genital area. Never go to sleep with a heating pad in place. To prevent UTIs  · Drink plenty of water each day. This helps you urinate often, which clears bacteria from your system. (If you have kidney, heart, or liver disease and have to limit fluids, talk with your doctor before you increase your fluid intake.)  · Urinate when you need to. · Urinate right after you have sex. · Change sanitary pads often. · Avoid douches, bubble baths, feminine hygiene sprays, and other feminine hygiene products that have deodorants. · After going to the bathroom, wipe from front to back.   When should you call for help? Call your doctor now or seek immediate medical care if:  ? · Symptoms such as fever, chills, nausea, or vomiting get worse or appear for the first time. ? · You have new pain in your back just below your rib cage. This is called flank pain. ? · There is new blood or pus in your urine. ? · You have any problems with your antibiotic medicine. ? Watch closely for changes in your health, and be sure to contact your doctor if:  ? · You are not getting better after taking an antibiotic for 2 days. ? · Your symptoms go away but then come back. Where can you learn more? Go to http://ruba-silvia.info/. Enter Y556 in the search box to learn more about \"Urinary Tract Infection in Women: Care Instructions. \"  Current as of: May 12, 2017  Content Version: 11.4  © 6661-3794 [employer]. Care instructions adapted under license by [employer] (which disclaims liability or warranty for this information). If you have questions about a medical condition or this instruction, always ask your healthcare professional. [address] any warranty or liability for your use of this information. Abdominal Pain: Care Instructions  Your Care Instructions    Abdominal pain has many possible causes. Some aren't serious and get better on their own in a few days. Others need more testing and treatment. If your pain continues or gets worse, you need to be rechecked and may need more tests to find out what is wrong. You may need surgery to correct the problem. Don't ignore new symptoms, such as fever, nausea and vomiting, urination problems, pain that gets worse, and dizziness. These may be signs of a more serious problem. Your doctor may have recommended a follow-up visit in the next 8 to 12 hours. If you are not getting better, you may need more tests or treatment. The doctor has checked you carefully, but problems can develop later. If you notice any problems or new symptoms, get medical treatment right away. Follow-up care is a key part of your treatment and safety. Be sure to make and go to all appointments, and call your doctor if you are having problems. It's also a good idea to know your test results and keep a list of the medicines you take. How can you care for yourself at home? · Rest until you feel better. · To prevent dehydration, drink plenty of fluids, enough so that your urine is light yellow or clear like water. Choose water and other caffeine-free clear liquids until you feel better. If you have kidney, heart, or liver disease and have to limit fluids, talk with your doctor before you increase the amount of fluids you drink. · If your stomach is upset, eat mild foods, such as rice, dry toast or crackers, bananas, and applesauce. Try eating several small meals instead of two or three large ones. · Wait until 48 hours after all symptoms have gone away before you have spicy foods, alcohol, and drinks that contain caffeine. · Do not eat foods that are high in fat. · Avoid anti-inflammatory medicines such as aspirin, ibuprofen (Advil, Motrin), and naproxen (Aleve). These can cause stomach upset. Talk to your doctor if you take daily aspirin for another health problem. When should you call for help? Call 911 anytime you think you may need emergency care. For example, call if:  ? · You passed out (lost consciousness). ? · You pass maroon or very bloody stools. ? · You vomit blood or what looks like coffee grounds. ? · You have new, severe belly pain. ?Call your doctor now or seek immediate medical care if:  ? · Your pain gets worse, especially if it becomes focused in one area of your belly. ? · You have a new or higher fever. ? · Your stools are black and look like tar, or they have streaks of blood. ? · You have unexpected vaginal bleeding. ? · You have symptoms of a urinary tract infection.  These may include:  ¨ Pain when you urinate. ¨ Urinating more often than usual.  ¨ Blood in your urine. ? · You are dizzy or lightheaded, or you feel like you may faint. ? Watch closely for changes in your health, and be sure to contact your doctor if:  ? · You are not getting better after 1 day (24 hours). Where can you learn more? Go to http://ruba-silvia.info/. Enter J615 in the search box to learn more about \"Abdominal Pain: Care Instructions. \"  Current as of: March 20, 2017  Content Version: 11.4  © 0554-8508 [employer]. Care instructions adapted under license by [employer] (which disclaims liability or warranty for this information). If you have questions about a medical condition or this instruction, always ask your healthcare professional. [address] any warranty or liability for your use of this information. Nausea and Vomiting: Care Instructions  Your Care Instructions    When you are nauseated, you may feel weak and sweaty and notice a lot of saliva in your mouth. Nausea often leads to vomiting. Most of the time you do not need to worry about nausea and vomiting, but they can be signs of other illnesses. Two common causes of nausea and vomiting are stomach flu and food poisoning. Nausea and vomiting from viral stomach flu will usually start to improve within 24 hours. Nausea and vomiting from food poisoning may last from 12 to 48 hours. The doctor has checked you carefully, but problems can develop later. If you notice any problems or new symptoms, get medical treatment right away. Follow-up care is a key part of your treatment and safety. Be sure to make and go to all appointments, and call your doctor if you are having problems. It's also a good idea to know your test results and keep a list of the medicines you take. How can you care for yourself at home?   · To prevent dehydration, drink plenty of fluids, enough so that your urine is light yellow or clear like water. Choose water and other caffeine-free clear liquids until you feel better. If you have kidney, heart, or liver disease and have to limit fluids, talk with your doctor before you increase the amount of fluids you drink. · Rest in bed until you feel better. · When you are able to eat, try clear soups, mild foods, and liquids until all symptoms are gone for 12 to 48 hours. Other good choices include dry toast, crackers, cooked cereal, and gelatin dessert, such as Jell-O. When should you call for help? Call 911 anytime you think you may need emergency care. For example, call if:  ? · You passed out (lost consciousness). ?Call your doctor now or seek immediate medical care if:  ? · You have symptoms of dehydration, such as:  ¨ Dry eyes and a dry mouth. ¨ Passing only a little dark urine. ¨ Feeling thirstier than usual.   ? · You have new or worsening belly pain. ? · You have a new or higher fever. ? · You vomit blood or what looks like coffee grounds. ? Watch closely for changes in your health, and be sure to contact your doctor if:  ? · You have ongoing nausea and vomiting. ? · Your vomiting is getting worse. ? · Your vomiting lasts longer than 2 days. ? · You are not getting better as expected. Where can you learn more? Go to http://ruba-silvia.info/. Enter 25 [id number] in the search box to learn more about \"Nausea and Vomiting: Care Instructions. \"  Current as of: March 20, 2017  Content Version: 11.4  © 9079-5820 [employer]. Care instructions adapted under license by [employer] (which disclaims liability or warranty for this information). If you have questions about a medical condition or this instruction, always ask your healthcare professional. [address] any warranty or liability for your use of this information.

## 2018-03-16 NOTE — ED TRIAGE NOTES
Mid abdominal pain with nausea worsening over one week. Patient states she has been dealing with abdominal issues lately and was told it was due to fibro myalgia.

## 2018-03-18 LAB
BACTERIA SPEC CULT: NORMAL
SERVICE CMNT-IMP: NORMAL

## 2018-04-06 ENCOUNTER — APPOINTMENT (OUTPATIENT)
Dept: GENERAL RADIOLOGY | Age: 23
End: 2018-04-06
Attending: EMERGENCY MEDICINE
Payer: COMMERCIAL

## 2018-04-06 ENCOUNTER — HOSPITAL ENCOUNTER (EMERGENCY)
Age: 23
Discharge: HOME | End: 2018-04-06
Attending: EMERGENCY MEDICINE | Admitting: EMERGENCY MEDICINE
Payer: COMMERCIAL

## 2018-04-06 VITALS
RESPIRATION RATE: 18 BRPM | HEART RATE: 89 BPM | BODY MASS INDEX: 41.11 KG/M2 | WEIGHT: 232 LBS | TEMPERATURE: 97.9 F | SYSTOLIC BLOOD PRESSURE: 118 MMHG | DIASTOLIC BLOOD PRESSURE: 79 MMHG | OXYGEN SATURATION: 99 % | HEIGHT: 63 IN

## 2018-04-06 DIAGNOSIS — M77.50 TENDONITIS OF FOOT: Primary | ICD-10-CM

## 2018-04-06 PROCEDURE — 73630 X-RAY EXAM OF FOOT: CPT

## 2018-04-06 PROCEDURE — 99282 EMERGENCY DEPT VISIT SF MDM: CPT

## 2018-04-06 RX ORDER — IBUPROFEN 800 MG/1
TABLET ORAL
Qty: 15 TAB | Refills: 0 | Status: SHIPPED | OUTPATIENT
Start: 2018-04-06 | End: 2018-09-24

## 2018-04-06 NOTE — ED PROVIDER NOTES
EMERGENCY DEPARTMENT HISTORY AND PHYSICAL EXAM    9:05 AM      Date: 4/6/2018  Patient Name: Rosalinda Sanchez    History of Presenting Illness     Chief Complaint   Patient presents with    Foot Injury         History Provided By: Patient    Chief Complaint: Foot Pain   Duration:  Months  Timing:  Worsening  Location: Right foot   Quality: N/A  Severity: N/A  Modifying Factors: Walking down stairs   Associated Symptoms: denies any other associated signs or symptoms      Additional History (Context): Rosalinda Sanchez is a 26 y.o. female presenting to the ED c/o worsening right foot pain for the past couple months. States she injured her foot at the gym a couple months ago. Pt states she decided to come in today for evaluation due to increased pain with walking down stairs. Denies any other symptoms or complaints. PCP: Kortney Dhillon MD    Current Outpatient Prescriptions   Medication Sig Dispense Refill    ibuprofen (MOTRIN) 800 mg tablet 1 tab q 6-8 hours PRN pain. 15 Tab 0    ondansetron (ZOFRAN ODT) 4 mg disintegrating tablet Take 1-2 tablets every 6-8 hours as needed for nausea and vomiting. Indications: nausea 10 Tab 0    LORazepam (ATIVAN) 0.5 mg tablet   0    ARIPiprazole (ABILIFY) 2 mg tablet       ammonium lactate (AMLACTIN) 12 % topical cream apply to affected area twice a day if needed  0    LORazepam (ATIVAN) 1 mg tablet take 1 tablet by mouth MAY REPEAT IN 1 HOUR IF NEEDED  0    albuterol-ipratropium (DUO-NEB) 2.5 mg-0.5 mg/3 ml nebu inhale contents of 1 vial every 4 to 6 hours in nebulizer if needed  0    gabapentin (NEURONTIN) 300 mg capsule take 1 capsule by mouth three times a day  0    albuterol (PROVENTIL HFA, VENTOLIN HFA, PROAIR HFA) 90 mcg/actuation inhaler Take 2 Puffs by inhalation every four (4) hours as needed for Wheezing or Shortness of Breath (or cough). 1 Inhaler 1    traMADol (ULTRAM) 50 mg tablet Take 1 Tab by mouth every six (6) hours as needed for Pain.  Max Daily Amount: 200 mg. Indications: Pain 6 Tab 0    doxycycline (MONODOX) 50 mg capsule          Past History     Past Medical History:  Past Medical History:   Diagnosis Date    ADHD (attention deficit hyperactivity disorder)     Asthma     Depression     Fibromyalgia 05/2017    Migraine     Overactive bladder     Sinus infection        Past Surgical History:  Past Surgical History:   Procedure Laterality Date    APPENDECTOMY      HX CHOLECYSTECTOMY      HX HEENT      ear tubes bilaterally    HX UROLOGICAL  01/18/2018    Cysto, inj Botox 200 units with Dr. Violette Oden at [facility]       Family History:  Family History   Problem Relation Age of Onset    Diabetes Maternal Aunt     Hypertension Maternal Grandmother     Diabetes Maternal Grandmother     Hypertension Maternal Grandfather        Social History:  Social History   Substance Use Topics    Smoking status: Never Smoker    Smokeless tobacco: Never Used    Alcohol use 0.6 oz/week     1 Cans of beer per week       Allergies: Allergies   Allergen Reactions    Pineapple Unknown (comments)     Facial numbness, swelling    Topamax [Topiramate] Other (comments)     Reports flu like symptoms         Review of Systems       Review of Systems   Constitutional: Negative for activity change, fatigue and fever. HENT: Negative for congestion and rhinorrhea. Eyes: Negative for visual disturbance. Respiratory: Negative for shortness of breath. Cardiovascular: Negative for chest pain and palpitations. Gastrointestinal: Negative for abdominal pain, diarrhea, nausea and vomiting. Genitourinary: Negative for dysuria and hematuria. Musculoskeletal: Negative for back pain.        + Foot pain (right)   Skin: Negative for rash. Neurological: Negative for dizziness, weakness and light-headedness. All other systems reviewed and are negative.         Physical Exam     Visit Vitals    /79 (BP 1 Location: Left arm)    Pulse 89    Temp 97.9 °F (36.6 °C)    Resp 18    Ht 5' 3\" (1.6 m)    Wt 105.2 kg (232 lb)    SpO2 99%    BMI 41.1 kg/m2         Physical Exam   Constitutional: She is oriented to person, place, and time. She appears well-developed and well-nourished. No distress. HENT:   Head: Normocephalic and atraumatic. Right Ear: External ear normal.   Left Ear: External ear normal.   Nose: Nose normal.   Mouth/Throat: Oropharynx is clear and moist.   Eyes: Conjunctivae and EOM are normal. Pupils are equal, round, and reactive to light. No scleral icterus. Neck: Normal range of motion. Neck supple. No JVD present. No tracheal deviation present. No thyromegaly present. Cardiovascular: Normal rate, regular rhythm, normal heart sounds and intact distal pulses. Exam reveals no gallop and no friction rub. No murmur heard. Pulmonary/Chest: Effort normal and breath sounds normal. She exhibits no tenderness. Abdominal: Soft. Bowel sounds are normal. She exhibits no distension. There is no tenderness. There is no rebound and no guarding. Musculoskeletal: Normal range of motion. She exhibits no edema. Right foot: There is tenderness (mild dorsally over first and second metatarsals). No swelling or skin changes. Lymphadenopathy:     She has no cervical adenopathy. Neurological: She is alert and oriented to person, place, and time. No cranial nerve deficit. Coordination normal.   No sensory loss, Gait normal, Motor 5/5   Skin: Skin is warm and dry. Psychiatric: She has a normal mood and affect. Her behavior is normal. Judgment and thought content normal.   Nursing note and vitals reviewed. Diagnostic Study Results     Labs -  No results found for this or any previous visit (from the past 12 hour(s)). Radiologic Studies -   XR FOOT RT MIN 3 V   Final Result   IMPRESSION:   No acute bony injuries. Medical Decision Making   I am the first provider for this patient.     I reviewed the vital signs, available nursing notes, past medical history, past surgical history, family history and social history. Vital Signs-Reviewed the patient's vital signs. Pulse Oximetry Analysis -  99% on room air, normal     Records Reviewed: Nursing Notes and Old Medical Records (Time of Review: 9:05 AM)    ED Course: Progress Notes, Reevaluation, and Consults:      Provider Notes (Medical Decision Making):     Diagnosis     Clinical Impression:   1. Tendonitis of foot        Disposition: Discharged     Follow-up Information     Follow up With Details Comments Contact Info    Jose Bro MD Call in 1 week If symptoms do not improve  [address]  [address] [facility] EMERGENCY DEPT  As needed, If symptoms worsen [address] [place] [id number] [phone]           Patient's Medications   Start Taking    IBUPROFEN (MOTRIN) 800 MG TABLET    1 tab q 6-8 hours PRN pain. Continue Taking    ALBUTEROL (PROVENTIL HFA, VENTOLIN HFA, PROAIR HFA) 90 MCG/ACTUATION INHALER    Take 2 Puffs by inhalation every four (4) hours as needed for Wheezing or Shortness of Breath (or cough). ALBUTEROL-IPRATROPIUM (DUO-NEB) 2.5 MG-0.5 MG/3 ML NEBU    inhale contents of 1 vial every 4 to 6 hours in nebulizer if needed    AMMONIUM LACTATE (AMLACTIN) 12 % TOPICAL CREAM    apply to affected area twice a day if needed    ARIPIPRAZOLE (ABILIFY) 2 MG TABLET        DOXYCYCLINE (MONODOX) 50 MG CAPSULE        GABAPENTIN (NEURONTIN) 300 MG CAPSULE    take 1 capsule by mouth three times a day    LORAZEPAM (ATIVAN) 0.5 MG TABLET        LORAZEPAM (ATIVAN) 1 MG TABLET    take 1 tablet by mouth MAY REPEAT IN 1 HOUR IF NEEDED    ONDANSETRON (ZOFRAN ODT) 4 MG DISINTEGRATING TABLET    Take 1-2 tablets every 6-8 hours as needed for nausea and vomiting. Indications: nausea    TRAMADOL (ULTRAM) 50 MG TABLET    Take 1 Tab by mouth every six (6) hours as needed for Pain. Max Daily Amount: 200 mg. Indications: Pain   These Medications have changed    No medications on file   Stop Taking    AMOXICILLIN-CLAVULANATE (AUGMENTIN) 875-125 MG PER TABLET    take 1 tablet by mouth every 12 hours for 7 days    AZITHROMYCIN (ZITHROMAX) 500 MG TAB    take 1 tablet by mouth PER WEEK    BENZONATATE (TESSALON) 200 MG CAPSULE    take 1 capsule by mouth every 8 hours if needed    BUDESONIDE (PULMICORT) 0.5 MG/2 ML NBSP    inhale contents of 1 vial in nebulizer twice a day    BUSPIRONE (BUSPAR) 5 MG TABLET    take 1 tablet by mouth twice a day    DOXYCYCLINE (ADOXA) 100 MG TABLET        KETOCONAZOLE (NIZORAL) 2 % SHAMPOO    LATHER AND LEAVE ON FOR 5 MINUTES AND RINSE. DAILY FOR FOUR WEEKS    LEVOTHYROXINE (SYNTHROID) 75 MCG TABLET        LITHIUM CARBONATE 300 MG CAPSULE    Take 600 mg by mouth two (2) times daily (with meals). MELOXICAM (MOBIC) 15 MG TABLET    Take 1 Tab by mouth daily (with breakfast). MONTELUKAST (SINGULAIR) 10 MG TABLET        PROMETHAZINE-CODEINE (PHENERGAN WITH CODEINE) 6.25-10 MG/5 ML SYRUP    take 5 milliliters by mouth every 6 hours if needed    TRETINOIN (RETIN-A) 0.025 % TOPICAL CREAM    apply EVERY NIGHT TO FACE AS TOLERATED    VYVANSE 40 MG CAPSULE    take 1 capsule by mouth every morning     _______________________________    Attestations:  Scribe Attestation     Diego Mora acting as a scribe for and in the presence of Sara Silver MD      April 06, 2018 at 9:27 AM       Provider Attestation:      I personally performed the services described in the documentation, reviewed the documentation, as recorded by the scribe in my presence, and it accurately and completely records my words and actions. April 06, 2018 at 9:27 AM - Sara Silver MD    _______________________________  R foot wrapped by greg, checked by Florencia ABURTO MD  9:31 AM

## 2018-04-06 NOTE — DISCHARGE INSTRUCTIONS
Tendon Injury (Tendinopathy): Care Instructions  Your Care Instructions    Tendons are tough, flexible tissues that connect muscle to bone. A tendon can hurt or get torn from overuse or aging, especially tendons in the shoulder, elbow, wrist, hip, knee, or ankle. Tendon injuries may be called tendinopathy or tendinitis. Tendon injuries can occur from any motion you have to repeat in a job, sports, or daily activities. Tennis elbow is one common tendon injury. You can treat most tendon problems with over-the-counter pain medicine, rest, changes in your activities, and physical therapy. Follow-up care is a key part of your treatment and safety. Be sure to make and go to all appointments, and call your doctor if you are having problems. It's also a good idea to know your test results and keep a list of the medicines you take. How can you care for yourself at home? · Rest the sore area. You may have to stop doing the activity that caused the tendon pain for a while. · Take an over-the-counter pain medicine, such as acetaminophen (Tylenol), ibuprofen (Advil, Motrin), or naproxen (Aleve). Read and follow all instructions on the label. · Do not take two or more pain medicines at the same time unless the doctor told you to. Many pain medicines have acetaminophen, which is Tylenol. Too much acetaminophen (Tylenol) can be harmful. · Put ice or a cold pack on the sore area for 10 to 20 minutes at a time. Try to do this every 1 to 2 hours for the next 3 days (when you are awake) or until any swelling goes down. Put a thin cloth between the ice and your skin. · Prop up the sore area on a pillow when you ice it or anytime you sit or lie down during the next 3 days. Try to keep it above the level of your heart. This will help reduce swelling.   · Follow your doctor's advice for wearing and caring for a sling, splint, or cast. In some cases, you may wear one of these for a while to help your tendon heal.  · Follow your doctor's advice for stretching and physical therapy. Gently move your joint through its full range of motion. This will prevent stiffness in your joint. · Go back to your activity slowly. Warm up before and stretch after the activity. You also can try making some changes. For example, if a sport caused your tendon pain, alternate the sport with another activity. If using a tool causes pain, switch hands or change your . Stop the activity if it hurts. After the activity, apply ice to prevent pain and swelling. · Do not smoke. Smoking can slow healing. If you need help quitting, talk to your doctor about stop-smoking programs and medicines. These can increase your chances of quitting for good. When should you call for help? Watch closely for changes in your health, and be sure to contact your doctor if:  ? · Your pain gets worse. ? · You do not get better as expected. Where can you learn more? Go to http://ruba-silvia.info/. Enter A157 in the search box to learn more about \"Tendon Injury (Tendinopathy): Care Instructions. \"  Current as of: March 21, 2017  Content Version: 11.4  © 8016-1635 [employer]. Care instructions adapted under license by [employer] (which disclaims liability or warranty for this information). If you have questions about a medical condition or this instruction, always ask your healthcare professional. [address] any warranty or liability for your use of this information.

## 2018-04-06 NOTE — ED TRIAGE NOTES
Patient c/o foot pain to the right, patient hurt her foot a couple months ago and it's getting worse.

## 2018-04-06 NOTE — ED NOTES
Current Discharge Medication List      START taking these medications    Details   ibuprofen (MOTRIN) 800 mg tablet 1 tab q 6-8 hours PRN pain. Qty: 15 Tab, Refills: 0           Patient armband removed and shredded  Prescription given and reviewed with patient.

## 2018-10-02 ENCOUNTER — HOSPITAL ENCOUNTER (OUTPATIENT)
Dept: LAB | Age: 23
Discharge: HOME | End: 2018-10-02
Payer: COMMERCIAL

## 2018-10-02 LAB
BASOPHILS # BLD: 0 K/UL (ref 0–0.1)
BASOPHILS NFR BLD: 0 % (ref 0–2)
DIFFERENTIAL METHOD BLD: ABNORMAL
EOSINOPHIL # BLD: 0.1 K/UL (ref 0–0.4)
EOSINOPHIL NFR BLD: 1 % (ref 0–5)
ERYTHROCYTE [DISTWIDTH] IN BLOOD BY AUTOMATED COUNT: 14.4 % (ref 11.6–14.5)
HCT VFR BLD AUTO: 38.8 % (ref 35–45)
HGB BLD-MCNC: 12.6 G/DL (ref 12–16)
LYMPHOCYTES # BLD: 2.2 K/UL (ref 0.9–3.6)
LYMPHOCYTES NFR BLD: 21 % (ref 21–52)
MCH RBC QN AUTO: 26.9 PG (ref 24–34)
MCHC RBC AUTO-ENTMCNC: 32.5 G/DL (ref 31–37)
MCV RBC AUTO: 82.7 FL (ref 74–97)
MONOCYTES # BLD: 0.5 K/UL (ref 0.05–1.2)
MONOCYTES NFR BLD: 4 % (ref 3–10)
NEUTS SEG # BLD: 7.8 K/UL (ref 1.8–8)
NEUTS SEG NFR BLD: 74 % (ref 40–73)
PLATELET # BLD AUTO: 277 K/UL (ref 135–420)
PMV BLD AUTO: 11.1 FL (ref 9.2–11.8)
RBC # BLD AUTO: 4.69 M/UL (ref 4.2–5.3)
WBC # BLD AUTO: 10.6 K/UL (ref 4.6–13.2)

## 2018-10-02 PROCEDURE — 82784 ASSAY IGA/IGD/IGG/IGM EACH: CPT | Performed by: ALLERGY & IMMUNOLOGY

## 2018-10-02 PROCEDURE — 85025 COMPLETE CBC W/AUTO DIFF WBC: CPT | Performed by: ALLERGY & IMMUNOLOGY

## 2018-10-02 PROCEDURE — 82785 ASSAY OF IGE: CPT | Performed by: ALLERGY & IMMUNOLOGY

## 2018-10-02 PROCEDURE — 36415 COLL VENOUS BLD VENIPUNCTURE: CPT | Performed by: ALLERGY & IMMUNOLOGY

## 2018-10-03 LAB
IGA SERPL-MCNC: 112 MG/DL (ref 87–352)
IGG SERPL-MCNC: 628 MG/DL (ref 700–1600)
IGM SERPL-MCNC: 161 MG/DL (ref 26–217)

## 2018-10-04 LAB — IGE SERPL-ACNC: 7 IU/ML (ref 0–100)

## 2018-10-05 ENCOUNTER — HOSPITAL ENCOUNTER (EMERGENCY)
Age: 23
Discharge: HOME | End: 2018-10-05
Attending: EMERGENCY MEDICINE
Payer: COMMERCIAL

## 2018-10-05 VITALS
DIASTOLIC BLOOD PRESSURE: 86 MMHG | WEIGHT: 230 LBS | SYSTOLIC BLOOD PRESSURE: 134 MMHG | OXYGEN SATURATION: 99 % | HEIGHT: 63 IN | BODY MASS INDEX: 40.75 KG/M2 | HEART RATE: 91 BPM | RESPIRATION RATE: 18 BRPM | TEMPERATURE: 98.9 F

## 2018-10-05 DIAGNOSIS — J02.9 ACUTE PHARYNGITIS, UNSPECIFIED ETIOLOGY: Primary | ICD-10-CM

## 2018-10-05 DIAGNOSIS — J03.90 ACUTE TONSILLITIS, UNSPECIFIED ETIOLOGY: ICD-10-CM

## 2018-10-05 DIAGNOSIS — R05.9 COUGH: ICD-10-CM

## 2018-10-05 PROCEDURE — 99281 EMR DPT VST MAYX REQ PHY/QHP: CPT

## 2018-10-05 RX ORDER — BENZONATATE 100 MG/1
100 CAPSULE ORAL
Qty: 30 CAP | Refills: 0 | Status: SHIPPED | OUTPATIENT
Start: 2018-10-05 | End: 2018-10-12

## 2018-10-05 RX ORDER — AZITHROMYCIN 500 MG/1
500 TABLET, FILM COATED ORAL SEE ADMIN INSTRUCTIONS
COMMUNITY
End: 2018-11-04

## 2018-10-05 RX ORDER — AMOXICILLIN AND CLAVULANATE POTASSIUM 875; 125 MG/1; MG/1
1 TABLET, FILM COATED ORAL 2 TIMES DAILY
Qty: 20 TAB | Refills: 0 | Status: SHIPPED | OUTPATIENT
Start: 2018-10-05 | End: 2018-11-04

## 2018-10-05 NOTE — ED NOTES
Christa Zendejas is a 26 y.o. female that was discharged in stable condition. The patients diagnosis, condition and treatment were explained to  patient and aftercare instructions were given. The patient verbalized understanding. Patient armband removed and shredded.

## 2018-10-05 NOTE — ED PROVIDER NOTES
EMERGENCY DEPARTMENT HISTORY AND PHYSICAL EXAM 
 
Date: (Not on file) Patient Name: Kali Jacob History of Presenting Illness Chief Complaint Patient presents with  Sore Throat  Cough  Chest Congestion History Provided By:patient Chief Complaint: sore throat Duration: few days Timing:  acute Location: throat Tonia Bottcher Severity: moderate Modifying Factors: nyquil has not helped Associated Symptoms cough. , fever, congestion, sinus pressure and pain, chills Additional History (Context): Kali Jacob is a 26 y.o. female with PMH asthma,  
ADHD, migraines, depression, and fibromyalgia who presents with complaints of a sore throat, fever, chills, cough, congestion, sinus pressure and pain x a few days. Sx not alleviated with nyquil, no other complaints. PCP: Xochitl Ponce MD 
 
Current Outpatient Prescriptions Medication Sig Dispense Refill  amphetamine-dextroamphetamine XR (ADDERALL XR) 15 mg XR capsule dextroamphetamine-amphetamine ER 15 mg 24hr capsule,extend release  adapalene-benzoyl peroxide (EPIDUO) 0.1-2.5 % glwp Epiduo 0.1 %-2.5 % topical gel  gabapentin (NEURONTIN) 300 mg capsule take 1 capsule by mouth three times a day  0  
 albuterol (PROVENTIL HFA, VENTOLIN HFA, PROAIR HFA) 90 mcg/actuation inhaler Take 2 Puffs by inhalation every four (4) hours as needed for Wheezing or Shortness of Breath (or cough). 1 Inhaler 1 Past History Past Medical History: 
Past Medical History:  
Diagnosis Date  ADHD (attention deficit hyperactivity disorder)  Asthma  Depression  Fibromyalgia 05/2017  Migraine  Overactive bladder  Sinus infection Past Surgical History: 
Past Surgical History:  
Procedure Laterality Date  APPENDECTOMY  HX CHOLECYSTECTOMY  HX HEENT    
 ear tubes bilaterally  HX UROLOGICAL  01/18/2018  Cysto, inj Botox 200 units with Dr. Shantell Villafuerte at [address] [place] [address] [place] Family History: 
Family History Problem Relation Age of Onset  Diabetes Maternal Aunt  Hypertension Maternal Grandmother  Diabetes Maternal Grandmother  Hypertension Maternal Grandfather Social History: 
Social History Substance Use Topics  Smoking status: Never Smoker  Smokeless tobacco: Never Used  Alcohol use 0.6 oz/week 1 Cans of beer per week Allergies: Allergies Allergen Reactions  Pineapple Unknown (comments) Facial numbness, swelling  Topamax [Topiramate] Other (comments) Reports flu like symptoms Review of Systems Review of Systems Constitutional: Positive for chills and fever. HENT: Positive for congestion, sinus pain, sinus pressure and sore throat. Negative for ear pain and rhinorrhea. Eyes: Negative. Negative for pain and redness. Respiratory: Positive for cough. Negative for shortness of breath, wheezing and stridor. Cardiovascular: Negative. Negative for chest pain and leg swelling. Gastrointestinal: Negative. Negative for abdominal pain, constipation, diarrhea, nausea and vomiting. Genitourinary: Negative. Negative for dysuria and frequency. Musculoskeletal: Negative. Negative for back pain and neck pain. Skin: Negative. Negative for rash and wound. Neurological: Negative. Negative for dizziness, seizures, syncope and headaches. All other systems reviewed and are negative. All Other Systems Negative Physical Exam  
 
Vitals:  
 10/05/18 1018 BP: 134/86 Pulse: 91  
Resp: 18 Temp: 98.9 °F (37.2 °C) SpO2: 99% Weight: 104.3 kg (230 lb) Height: 5' 3\" (1.6 m) Physical Exam  
Constitutional: She is oriented to person, place, and time. She appears well-developed and well-nourished. She appears distressed. HENT:  
Head: Normocephalic and atraumatic.   
Right Ear: External ear normal.  
Left Ear: External ear normal.  
 Scarring noted to the bilateral TM without evidence of OM or OE. Tonsils enlarged, erythematous, with bilateral exudates, mallampati 3, airway is patent, able to clear secretions, no trismus drooling or uvular deviation noted. Eyes: Conjunctivae are normal. Right eye exhibits no discharge. Left eye exhibits no discharge. No scleral icterus. Neck: Normal range of motion. Neck supple. Cardiovascular: Normal rate, regular rhythm and normal heart sounds. Exam reveals no gallop and no friction rub. No murmur heard. Pulmonary/Chest: Effort normal and breath sounds normal. No stridor. No respiratory distress. She has no wheezes. She has no rales. Musculoskeletal: Normal range of motion. Lymphadenopathy:  
  She has no cervical adenopathy. Neurological: She is alert and oriented to person, place, and time. Coordination normal.  
Gait is steady. Able to ambulate without difficulty. Skin: Skin is warm and dry. No rash noted. She is not diaphoretic. No erythema. Psychiatric: She has a normal mood and affect. Her behavior is normal. Thought content normal.  
Nursing note and vitals reviewed. Diagnostic Study Results Labs - No results found for this or any previous visit (from the past 12 hour(s)). Radiologic Studies - No orders to display CT Results  (Last 48 hours) None CXR Results  (Last 48 hours) None Medical Decision Making I am the first provider for this patient. I reviewed the vital signs, available nursing notes, past medical history, past surgical history, family history and social history. Vital Signs-Reviewed the patient's vital signs. Records Reviewed: Anette Mena PA-C 10:23 AM  
 
Procedures: 
Procedures Provider Notes (Medical Decision Making): Impression:  Pharyngitis, tonsillitis, cough MED RECONCILIATION: 
No current facility-administered medications for this encounter. Current Outpatient Prescriptions Medication Sig  
 amphetamine-dextroamphetamine XR (ADDERALL XR) 15 mg XR capsule dextroamphetamine-amphetamine ER 15 mg 24hr capsule,extend release  adapalene-benzoyl peroxide (EPIDUO) 0.1-2.5 % glwp Epiduo 0.1 %-2.5 % topical gel  gabapentin (NEURONTIN) 300 mg capsule take 1 capsule by mouth three times a day  albuterol (PROVENTIL HFA, VENTOLIN HFA, PROAIR HFA) 90 mcg/actuation inhaler Take 2 Puffs by inhalation every four (4) hours as needed for Wheezing or Shortness of Breath (or cough). Disposition: D/c 
 
DISCHARGE NOTE:  
Patient is stable for discharge at this time. Rx for augmentin and tessalon given. Rest and follow-up with PCP this week. Return to the ED immediately for any new or worsening sx. Anette Mena PA-C 10:24 AM  
 
 
Diagnosis Clinical Impression: pharyngitis, tonsillitis, cough

## 2018-10-05 NOTE — DISCHARGE INSTRUCTIONS
[employer] Activation    Thank you for requesting access to [employer]. Please follow the instructions below to securely access and download your online medical record. [employer] allows you to send messages to your doctor, view your test results, renew your prescriptions, schedule appointments, and more. How Do I Sign Up? 1. In your internet browser, go to [email or web address]  2. Click on the First Time User? Click Here link in the Sign In box. You will be redirect to the New Member Sign Up page. 3. Enter your [employer] Access Code exactly as it appears below. You will not need to use this code after youve completed the sign-up process. If you do not sign up before the expiration date, you must request a new code. [employer] Access Code: [email or web address]  Expires: 12/3/2018  5:20 AM (This is the date your [employer] access code will )    4. Enter the last four digits of your Social Security Number (xxxx) and Date of Birth (mm/dd/yyyy) as indicated and click Submit. You will be taken to the next sign-up page. 5. Create a [employer] ID. This will be your [employer] login ID and cannot be changed, so think of one that is secure and easy to remember. 6. Create a [employer] password. You can change your password at any time. 7. Enter your Password Reset Question and Answer. This can be used at a later time if you forget your password. 8. Enter your e-mail address. You will receive e-mail notification when new information is available in [address]. 9. Click Sign Up. You can now view and download portions of your medical record. 10. Click the Download Summary menu link to download a portable copy of your medical information. Additional Information    If you have questions, please visit the Frequently Asked Questions section of the [employer] website at [email or web address] Remember, [employer] is NOT to be used for urgent needs. For medical emergencies, dial 911.

## 2018-10-05 NOTE — LETTER
NOTIFICATION OF RETURN TO WORK / SCHOOL 
 
10/5/2018 Ms. Rubén Gabriel [address] [address] To Whom It May Concern: 
 
Rubén Gabriel was seen in the ED on 10/5/18 and may be excused from work through Monday, 10/8/18. Sincerely, [place], [place]

## 2018-11-04 ENCOUNTER — APPOINTMENT (OUTPATIENT)
Dept: GENERAL RADIOLOGY | Age: 23
End: 2018-11-04
Attending: EMERGENCY MEDICINE
Payer: COMMERCIAL

## 2018-11-04 ENCOUNTER — HOSPITAL ENCOUNTER (EMERGENCY)
Age: 23
Discharge: HOME | End: 2018-11-04
Attending: EMERGENCY MEDICINE
Payer: COMMERCIAL

## 2018-11-04 VITALS
RESPIRATION RATE: 18 BRPM | TEMPERATURE: 98.3 F | WEIGHT: 220 LBS | OXYGEN SATURATION: 100 % | SYSTOLIC BLOOD PRESSURE: 134 MMHG | HEIGHT: 63 IN | HEART RATE: 98 BPM | DIASTOLIC BLOOD PRESSURE: 83 MMHG | BODY MASS INDEX: 38.98 KG/M2

## 2018-11-04 DIAGNOSIS — S76.211A GROIN STRAIN, RIGHT, INITIAL ENCOUNTER: Primary | ICD-10-CM

## 2018-11-04 PROCEDURE — 96372 THER/PROPH/DIAG INJ SC/IM: CPT

## 2018-11-04 PROCEDURE — 74011250636 HC RX REV CODE- 250/636: Performed by: EMERGENCY MEDICINE

## 2018-11-04 PROCEDURE — 99282 EMERGENCY DEPT VISIT SF MDM: CPT

## 2018-11-04 PROCEDURE — 73502 X-RAY EXAM HIP UNI 2-3 VIEWS: CPT

## 2018-11-04 RX ORDER — KETOROLAC TROMETHAMINE 30 MG/ML
60 INJECTION, SOLUTION INTRAMUSCULAR; INTRAVENOUS
Status: COMPLETED | OUTPATIENT
Start: 2018-11-04 | End: 2018-11-04

## 2018-11-04 RX ORDER — CYCLOBENZAPRINE HCL 10 MG
10 TABLET ORAL
Qty: 15 TAB | Refills: 0 | Status: SHIPPED | OUTPATIENT
Start: 2018-11-04 | End: 2019-02-23

## 2018-11-04 RX ORDER — IBUPROFEN 600 MG/1
600 TABLET ORAL
Qty: 20 TAB | Refills: 0 | Status: SHIPPED | OUTPATIENT
Start: 2018-11-04 | End: 2019-02-23

## 2018-11-04 RX ADMIN — KETOROLAC TROMETHAMINE 60 MG: 30 INJECTION, SOLUTION INTRAMUSCULAR at 09:54

## 2018-11-04 NOTE — ED PROVIDER NOTES
EMERGENCY DEPARTMENT HISTORY AND PHYSICAL EXAM 
 
12:37 PM 
 
 
Date: 11/4/2018 Patient Name: Derek Cabrera History of Presenting Illness Chief Complaint Patient presents with  Leg Pain  
  right leg pain, patient was out hunting yesterday and now her right upper leg hurts History Provided By: Patient Chief Complaint: Leg pain Duration: 1 Days Timing:  Acute and Worsening Location: Right leg Quality: Not obtained Severity: Moderate Modifying Factors: Rotating leg worsens pain Associated Symptoms: denies any other associated signs or symptoms Additional History (Context): 12:39 PM Derek Cabrera is a 26 y.o. female with h/o fibromyalgia and other noted PMHx who presents to ED complaining of acute, worsening, moderate right leg pain onset 1 day. The patient reports that the pain radiates from her groin down her right leg. She states that she was hunting with her boyfriend, when she stood up and \"felt something pull on the front. \" The patient notes that this has never happened in the past. She states that she was sitting with her legs crossed before standing up. The patient claims that she has an IUD. No other concerns or symptoms at this time. PCP: Omayra Mccarthy MD 
 
 
 
Past History Past Medical History: 
Past Medical History:  
Diagnosis Date  ADHD (attention deficit hyperactivity disorder)  Asthma  Depression  Fibromyalgia 05/2017  Hypogammaglobulinemia ([address]  Migraine  Overactive bladder  Sinus infection Past Surgical History: 
Past Surgical History:  
Procedure Laterality Date  APPENDECTOMY  HX CHOLECYSTECTOMY  HX HEENT    
 ear tubes bilaterally  HX UROLOGICAL  01/18/2018 Cysto, inj Botox 200 units with Dr. Clark at [facility] Family History: 
Family History Problem Relation Age of Onset  Diabetes Maternal Aunt  Hypertension Maternal Grandmother  Diabetes Maternal Grandmother  Hypertension Maternal Grandfather Social History: 
Social History Tobacco Use  Smoking status: Never Smoker  Smokeless tobacco: Never Used Substance Use Topics  Alcohol use: Yes Alcohol/week: 0.6 oz Types: 1 Cans of beer per week  Drug use: No  
 
 
Allergies: Allergies Allergen Reactions  Pineapple Unknown (comments) Facial numbness, swelling  Topamax [Topiramate] Other (comments) Reports flu like symptoms Review of Systems Review of Systems Constitutional: Negative for activity change, fatigue and fever. HENT: Negative for congestion and rhinorrhea. Eyes: Negative for visual disturbance. Respiratory: Negative for shortness of breath. Cardiovascular: Negative for chest pain and palpitations. Gastrointestinal: Negative for abdominal pain, diarrhea, nausea and vomiting. Genitourinary: Negative for dysuria and hematuria. Musculoskeletal: Positive for myalgias. Negative for back pain. Skin: Negative for rash. Neurological: Negative for dizziness, weakness and light-headedness. All other systems reviewed and are negative. Physical Exam  
 
Visit Vitals /83 (BP 1 Location: Left arm, BP Patient Position: At rest) Pulse 98 Temp 98.3 °F (36.8 °C) Resp 18 Ht 5' 3\" (1.6 m) Wt 99.8 kg (220 lb) SpO2 100% BMI 38.97 kg/m² Physical Exam  
Constitutional: She is oriented to person, place, and time. She appears well-developed and well-nourished. No distress. Overweight. No distress. HENT:  
Head: Normocephalic and atraumatic. Right Ear: External ear normal.  
Left Ear: External ear normal.  
Nose: Nose normal.  
Mouth/Throat: Oropharynx is clear and moist.  
Eyes: Conjunctivae and EOM are normal. Pupils are equal, round, and reactive to light. No scleral icterus. Neck: Normal range of motion. Neck supple. No JVD present.  No tracheal deviation present. No thyromegaly present. Cardiovascular: Normal rate, regular rhythm, normal heart sounds and intact distal pulses. Exam reveals no gallop and no friction rub. No murmur heard. Inguinal pulse intact. Pulmonary/Chest: Effort normal and breath sounds normal. She exhibits no tenderness. Abdominal: Soft. Bowel sounds are normal. She exhibits no distension. There is no tenderness. There is no rebound and no guarding. Musculoskeletal: Normal range of motion. She exhibits tenderness. She exhibits no edema. Right leg held straight, pain noted to medial quadricept radiating to inguinal crease. No masses palpable. Pt has pain with external ROM, less with internal ROM. Lymphadenopathy:  
  She has no cervical adenopathy. Neurological: She is alert and oriented to person, place, and time. No cranial nerve deficit. Coordination normal.  
No sensory loss, Gait normal, Motor 5/5 Skin: Skin is warm and dry. Psychiatric: She has a normal mood and affect. Her behavior is normal. Judgment and thought content normal.  
Nursing note and vitals reviewed. Diagnostic Study Results Labs - No results found for this or any previous visit (from the past 12 hour(s)). Radiologic Studies -  
XR HIP RT W OR WO PELV 2-3 VWS Final Result Medical Decision Making I am the first provider for this patient. I reviewed the vital signs, available nursing notes, past medical history, past surgical history, family history and social history. Vital Signs-Reviewed the patient's vital signs. Pulse Oximetry Analysis -  100% on room air (Interpretation) WNL Cardiac Monitor: 
Rate: 98 Rhythm:  Normal Sinus Rhythm Records Reviewed: Nursing Notes (Time of Review: 12:37 PM) Provider Notes (Medical Decision Making): MDM Number of Diagnoses or Management Options Groin strain, right, initial encounter:  
Diagnosis management comments: 26 y.o.  Female with h/o IUD, fibromyalgia presents with right hip medial thigh pain after standing up from seated position while hunting. No clear evidence calf tenderness. Pain local to groin, suggestive of groin strain, likely quadricept strain. Will XR for avulsion fracture, give Toradol. If reassuring, will plan for close out-patient care. Medications  
ketorolac tromethamine (TORADOL) 60 mg/2 mL injection 60 mg (60 mg IntraMUSCular Given 11/4/18 0954) Diagnosis Clinical Impression: 1. Groin strain, right, initial encounter Disposition: DC Follow-up Information Follow up With Specialties Details Why Contact Info Jessie Simmons MD Orthopedic Surgery   [place] [address] [address] [facility] 
[phone] [address] [facility] EMERGENCY DEPT Emergency Medicine Go to  [address] [address] [phone] Medication List  
  
START taking these medications   
cyclobenzaprine 10 mg tablet Commonly known as:  FLEXERIL Take 1 Tab by mouth three (3) times daily as needed for Muscle Spasm(s). ibuprofen 600 mg tablet Commonly known as:  MOTRIN Take 1 Tab by mouth every six (6) hours as needed for Pain. CONTINUE taking these medications   
albuterol 90 mcg/actuation inhaler Commonly known as:  PROVENTIL HFA, VENTOLIN HFA, PROAIR HFA Take 2 Puffs by inhalation every four (4) hours as needed for Wheezing or Shortness of Breath (or cough). STOP taking these medications ZITHROMAX 500 mg Tab Generic drug:  azithromycin Where to Get Your Medications These medications were sent to [address] [place] [address] [facility] Phone:  [phone] · cyclobenzaprine 10 mg tablet · ibuprofen 600 mg tablet 
  
 
_______________________________ Attestations: 
Scribe Attestation Viviana Person acting as a scribe for and in the presence of Marie Dinero MD     
 November 04, 2018 at 12:38 PM 
    
Provider Attestation:     
I personally performed the services described in the documentation, reviewed the documentation, as recorded by the scribe in my presence, and it accurately and completely records my words and actions. November 04, 2018 at 12:38 PM - Marlena Snow MD   
_______________________________

## 2018-11-04 NOTE — LETTER
[address] [place] [address] Dr [facility] EMERGENCY DEPT 
[address] [facility] [address] [phone] Work/School Note Date: 11/4/2018 To Whom It May concern: 
 
Faviola Bonner was seen and treated today in the emergency room by the following provider(s): 
Attending Provider: Gustavo Ruiz MD.   
 
Faviola Bonner may return to work on 11/05/2018 Sincerely, Gustavo Ruiz MD

## 2018-11-04 NOTE — DISCHARGE INSTRUCTIONS
Parth Swan is a 26 y.o. female that was discharged in stable condition. The patients diagnosis, condition and treatment were explained to patient and aftercare instructions were given. The p     Muscle Strain: Care Instructions  Your Care Instructions    A muscle strain happens when you overstretch, or pull, a muscle. It can happen when you exercise or lift something or when you have an accident. Rest and other home care can help the muscle heal.  Follow-up care is a key part of your treatment and safety. Be sure to make and go to all appointments, and call your doctor if you are having problems. It's also a good idea to know your test results and keep a list of the medicines you take. How can you care for yourself at home? · Rest the strained muscle. Do not put weight on it for a day or two. If your doctor advises you to, use crutches or a sling to rest a sore limb. · Put ice or a cold pack on the sore muscle for 10 to 20 minutes at a time to stop swelling. Put a thin cloth between the ice pack and your skin. · Prop up the sore arm or leg on a pillow when you ice it or anytime you sit or lie down during the next 3 days. Try to keep it above the level of your heart. This will help reduce swelling. · Take pain medicines exactly as directed. ? If the doctor gave you a prescription medicine for pain, take it as prescribed. ? If you are not taking a prescription pain medicine, ask your doctor if you can take an over-the-counter medicine. · Do not do anything that makes the pain worse. Return to exercise gradually as you feel better. When should you call for help?   Call your doctor now or seek immediate medical care if:    · You have new severe pain.     · Your injured limb is cool or pale or changes color.     · You have tingling, weakness, or numbness in your injured limb.     · You cannot move the injured area.    Watch closely for changes in your health, and be sure to contact your doctor if:    · You cannot put weight on a joint, or it feels unsteady when you walk.     · Pain and swelling get worse or do not start to get better after 2 days of home treatment. Where can you learn more? Go to http://ruba-silvia.info/. Enter L184 in the search box to learn more about \"Muscle Strain: Care Instructions. \"  Current as of: November 29, 2017  Content Version: 11.8  © 6403-5942 [employer]. Care instructions adapted under license by [employer] (which disclaims liability or warranty for this information). If you have questions about a medical condition or this instruction, always ask your healthcare professional. [place] [address] any warranty or liability for your use of this information. atient verbalized understanding. Patient armband removed and shredded.

## 2019-02-23 ENCOUNTER — APPOINTMENT (OUTPATIENT)
Dept: GENERAL RADIOLOGY | Age: 24
End: 2019-02-23
Attending: PHYSICIAN ASSISTANT
Payer: COMMERCIAL

## 2019-02-23 ENCOUNTER — HOSPITAL ENCOUNTER (EMERGENCY)
Age: 24
Discharge: HOME | End: 2019-02-23
Attending: EMERGENCY MEDICINE
Payer: COMMERCIAL

## 2019-02-23 VITALS
DIASTOLIC BLOOD PRESSURE: 81 MMHG | BODY MASS INDEX: 38.98 KG/M2 | WEIGHT: 220 LBS | RESPIRATION RATE: 20 BRPM | SYSTOLIC BLOOD PRESSURE: 127 MMHG | OXYGEN SATURATION: 100 % | TEMPERATURE: 98.2 F | HEART RATE: 94 BPM | HEIGHT: 63 IN

## 2019-02-23 DIAGNOSIS — S60.031A CONTUSION OF RIGHT MIDDLE FINGER WITHOUT DAMAGE TO NAIL, INITIAL ENCOUNTER: ICD-10-CM

## 2019-02-23 DIAGNOSIS — S16.1XXA STRAIN OF NECK MUSCLE, INITIAL ENCOUNTER: ICD-10-CM

## 2019-02-23 DIAGNOSIS — S39.012A STRAIN OF LUMBAR REGION, INITIAL ENCOUNTER: ICD-10-CM
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
## 2019-02-23 LAB — HCG UR QL: NEGATIVE

## 2019-02-23 PROCEDURE — 81025 URINE PREGNANCY TEST: CPT

## 2019-02-23 PROCEDURE — 73140 X-RAY EXAM OF FINGER(S): CPT

## 2019-02-23 PROCEDURE — 74011250637 HC RX REV CODE- 250/637: Performed by: PHYSICIAN ASSISTANT

## 2019-02-23 PROCEDURE — 99283 EMERGENCY DEPT VISIT LOW MDM: CPT

## 2019-02-23 RX ORDER — CYCLOBENZAPRINE HCL 5 MG
10 TABLET ORAL 3 TIMES DAILY
Qty: 12 TAB | Refills: 0 | Status: SHIPPED | OUTPATIENT
Start: 2019-02-23 | End: 2019-02-28

## 2019-02-23 RX ORDER — NAPROXEN 250 MG/1
500 TABLET ORAL
Status: COMPLETED | OUTPATIENT
Start: 2019-02-23 | End: 2019-02-23

## 2019-02-23 RX ORDER — CYCLOBENZAPRINE HCL 10 MG
10 TABLET ORAL
Status: COMPLETED | OUTPATIENT
Start: 2019-02-23 | End: 2019-02-23

## 2019-02-23 RX ORDER — NAPROXEN 500 MG/1
500 TABLET ORAL 2 TIMES DAILY WITH MEALS
Qty: 20 TAB | Refills: 0 | Status: SHIPPED | OUTPATIENT
Start: 2019-02-23 | End: 2019-02-28

## 2019-02-23 RX ORDER — NAPROXEN 500 MG/1
500 TABLET ORAL 2 TIMES DAILY WITH MEALS
Qty: 20 TAB | Refills: 0 | Status: SHIPPED | OUTPATIENT
Start: 2019-02-23 | End: 2019-02-23

## 2019-02-23 RX ADMIN — CYCLOBENZAPRINE HYDROCHLORIDE 10 MG: 10 TABLET, FILM COATED ORAL at 10:57

## 2019-02-23 RX ADMIN — NAPROXEN 500 MG: 250 TABLET ORAL at 10:58
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
## 2019-02-23 NOTE — ED NOTES
I have reviewed discharge instructions with the patient. The patient verbalized understanding. Current Discharge Medication List  
  
START taking these medications Details  
cyclobenzaprine (FLEXERIL) 5 mg tablet Take 2 Tabs by mouth three (3) times daily. Qty: 12 Tab, Refills: 0  
  
naproxen (NAPROSYN) 500 mg tablet Take 1 Tab by mouth two (2) times daily (with meals) for 10 days. Qty: 20 Tab, Refills: 0 Patient armband removed and shredded
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
## 2019-02-23 NOTE — LETTER
[facility] EMERGENCY DEPT 
[address] [facility] [id number] [phone] Work/School Note Date: 2/23/2019 To Whom It May concern: 
 
Brody Santos was seen and treated today in the emergency room by the following provider(s): 
Attending Provider: Kei Coley DO Physician Assistant: ALEJANDRA Michele.   
 
Brody Santos may return to work on 2/26/19.  
 
Sincerely, 
 
 
 
 
ALEJANDRA Mcallister

## 2019-02-28 ENCOUNTER — HOSPITAL ENCOUNTER (EMERGENCY)
Age: 24
Discharge: HOME | End: 2019-02-28
Attending: EMERGENCY MEDICINE
Payer: COMMERCIAL

## 2019-02-28 ENCOUNTER — APPOINTMENT (OUTPATIENT)
Dept: GENERAL RADIOLOGY | Age: 24
End: 2019-02-28
Attending: NURSE PRACTITIONER
Payer: COMMERCIAL

## 2019-02-28 VITALS
RESPIRATION RATE: 18 BRPM | TEMPERATURE: 98.1 F | DIASTOLIC BLOOD PRESSURE: 77 MMHG | SYSTOLIC BLOOD PRESSURE: 121 MMHG | HEIGHT: 63 IN | HEART RATE: 96 BPM | WEIGHT: 220 LBS | BODY MASS INDEX: 38.98 KG/M2 | OXYGEN SATURATION: 100 %

## 2019-02-28 DIAGNOSIS — S76.011A HIP STRAIN, RIGHT, INITIAL ENCOUNTER: Primary | ICD-10-CM

## 2019-02-28 DIAGNOSIS — S80.11XA CONTUSION OF RIGHT LOWER LEG, INITIAL ENCOUNTER: ICD-10-CM

## 2019-02-28 LAB — HCG UR QL: NEGATIVE

## 2019-02-28 PROCEDURE — 81025 URINE PREGNANCY TEST: CPT

## 2019-02-28 PROCEDURE — 99284 EMERGENCY DEPT VISIT MOD MDM: CPT

## 2019-02-28 PROCEDURE — 73590 X-RAY EXAM OF LOWER LEG: CPT

## 2019-02-28 PROCEDURE — 74011250637 HC RX REV CODE- 250/637: Performed by: NURSE PRACTITIONER

## 2019-02-28 PROCEDURE — 73502 X-RAY EXAM HIP UNI 2-3 VIEWS: CPT

## 2019-02-28 RX ORDER — CYCLOBENZAPRINE HCL 10 MG
5 TABLET ORAL
Status: COMPLETED | OUTPATIENT
Start: 2019-02-28 | End: 2019-02-28

## 2019-02-28 RX ORDER — OXYCODONE AND ACETAMINOPHEN 5; 325 MG/1; MG/1
1 TABLET ORAL
Status: COMPLETED | OUTPATIENT
Start: 2019-02-28 | End: 2019-02-28

## 2019-02-28 RX ADMIN — CYCLOBENZAPRINE HYDROCHLORIDE 5 MG: 10 TABLET, FILM COATED ORAL at 16:30

## 2019-02-28 RX ADMIN — OXYCODONE AND ACETAMINOPHEN 1 TABLET: 5; 325 TABLET ORAL at 16:29

## 2019-02-28 NOTE — DISCHARGE INSTRUCTIONS
Patient Education        Hip Flexor Strain: Rehab Exercises  Your Care Instructions  Here are some examples of typical rehabilitation exercises for your condition. Start each exercise slowly. Ease off the exercise if you start to have pain. Your doctor or physical therapist will tell you when you can start these exercises and which ones will work best for you. How to do the exercises  Pelvic tilt with marching    1. Lie on your back with your knees bent and your feet flat on the floor. 2. Tighten your belly muscles and buttocks, and press your lower back to the floor. 3. Keeping your knees bent, lift and then lower one leg up off the floor, and then lift and lower your other leg like you are marching. Each time you lift your leg, hold that position for about 6 seconds before lowering your leg. 4. Repeat 8 to 12 times. Scissors    1. Lie on your back with your knees bent at a 90-degree angle and your feet off the floor. 2. Tighten your belly muscles and buttocks, and press your lower back to the floor. 3. Slowly straighten one leg, and hold that position for about 6 seconds. Your leg should be about 12 inches off the floor. Bring that leg back to the starting position, and then straighten your other leg. Hold that position for about 6 seconds, and then switch legs again. 4. Repeat 8 to 12 times. Hamstring stretch (lying down)    1. Lie flat on your back with your legs straight. If you feel discomfort in your back, place a small towel roll under your lower back. 2. Holding the back of your affected leg for support, lift your leg straight up and toward your body until you feel a stretch at the back of your thigh. 3. Hold the stretch for at least 30 seconds. 4. Repeat 2 to 4 times. Quadricep and hip flexor stretch (lying on side)    1. Lie on your side with your good leg flat on the floor and your hand supporting your head.   2. Bend your top leg, and reach behind you to grab the front of that foot or ankle with your other hand. 3. Stretch your leg back by pulling your foot toward your buttock. You will feel the stretch in the front of your thigh. If this causes stress on your knee, do not do this stretch. 4. Hold the stretch for at least 15 to 30 seconds. 5. Repeat 2 to 4 times. Hip flexor stretch (kneeling)    1. Kneel on your affected leg and bend your good leg out in front of you, with that foot flat on the floor. If you feel discomfort in the front of your knee, place a towel under your knee. 2. Keeping your back straight, slowly push your hips forward until you feel a stretch in the upper thigh of your back leg and hip. 3. Hold the stretch for at least 15 to 30 seconds. 4. Repeat 2 to 4 times. Hip flexor stretch (edge of table)    1. Lie flat on your back on a table or flat bench, with your knees and lower legs hanging off the edge of the table. 2. Grab your good leg at the knee, and pull that knee back toward your chest. Relax your affected leg and let it hang down toward the floor until you feel a stretch in the upper thigh of your affected leg and hip. 3. Hold the stretch for at least 15 to 30 seconds. 4. Repeat 2 to 4 times. Follow-up care is a key part of your treatment and safety. Be sure to make and go to all appointments, and call your doctor if you are having problems. It's also a good idea to know your test results and keep a list of the medicines you take. Where can you learn more? Go to http://ruba-silvia.info/. Enter O558 in the search box to learn more about \"Hip Flexor Strain: Rehab Exercises. \"  Current as of: September 20, 2018  Content Version: 11.9  © 6175-4447 [employer]. Care instructions adapted under license by [employer] (which disclaims liability or warranty for this information).  If you have questions about a medical condition or this instruction, always ask your healthcare professional. [facility] disclaims any warranty or liability for your use of this information. Patient Education     Contusion: Care Instructions  Your Care Instructions  Contusion is the medical term for a bruise. It is the result of a direct blow or an impact, such as a fall. Contusions are common sports injuries. Most people think of a bruise as a black-and-blue spot. This happens when small blood vessels get torn and leak blood under the skin. But bones, muscles, and organs can also get bruised. This may damage deep tissues but not cause a bruise you can see. The doctor will do a physical exam to find the location of your contusion. You may also have tests to make sure you do not have a more serious injury, such as a broken bone or nerve damage. These may include X-rays or other imaging tests like a CT scan or MRI. Deep-tissue contusions may cause pain and swelling. But if there is no serious damage, they will often get better in a few weeks with home treatment. The doctor has checked you carefully, but problems can develop later. If you notice any problems or new symptoms, get medical treatment right away. Follow-up care is a key part of your treatment and safety. Be sure to make and go to all appointments, and call your doctor if you are having problems. It's also a good idea to know your test results and keep a list of the medicines you take. How can you care for yourself at home? · Put ice or a cold pack on the sore area for 10 to 20 minutes at a time to stop swelling. Put a thin cloth between the ice pack and your skin. · Be safe with medicines. Read and follow all instructions on the label. ¨ If the doctor gave you a prescription medicine for pain, take it as prescribed. ¨ If you are not taking a prescription pain medicine, ask your doctor if you can take an over-the-counter medicine. · If you can, prop up the sore area on pillows as much as possible for the next few days.  Try to keep the sore area above the level of your heart. When should you call for help? Call your doctor now or seek immediate medical care if:  · Your pain gets worse. · You have new or worse swelling. · You have tingling, weakness, or numbness in the area near the contusion. · The area near the contusion is cold or pale. Watch closely for changes in your health, and be sure to contact your doctor if:  · You do not get better as expected. Where can you learn more? Go to [employer].be  Enter [id number] in the search box to learn more about \"Contusion: Care Instructions. \"   © 4134-2234 [employer]wise, Incorporated. Care instructions adapted under license by [employer] (which disclaims liability or warranty for this information). This care instruction is for use with your licensed healthcare professional. If you have questions about a medical condition or this instruction, always ask your healthcare professional. [address] any warranty or liability for your use of this information. Content Version: 61.8.626749; Current as of: May 22, 2015         [facility] Activation    Thank you for requesting access to [facility]. Please follow the instructions below to securely access and download your online medical record. [facility] allows you to send messages to your doctor, view your test results, renew your prescriptions, schedule appointments, and more. How Do I Sign Up? 1. In your internet browser, go to [email or web address]  2. Click on the First Time User? Click Here link in the Sign In box. You will be redirect to the New Member Sign Up page. 3. Enter your [facility]plex Access Code exactly as it appears below. You will not need to use this code after youve completed the sign-up process. If you do not sign up before the expiration date, you must request a new code. [facility] Access Code:  Activation code not generated  Current [facility]plex Status: Patient Declined (This is the date your [facility]plex access code will )    4. Enter the last four digits of your Social Security Number (xxxx) and Date of Birth (mm/dd/yyyy) as indicated and click Submit. You will be taken to the next sign-up page. 5. Create a [facility] ID. This will be your [facility] login ID and cannot be changed, so think of one that is secure and easy to remember. 6. Create a [facility] password. You can change your password at any time. 7. Enter your Password Reset Question and Answer. This can be used at a later time if you forget your password. 8. Enter your e-mail address. You will receive e-mail notification when new information is available in [address] 9. Click Sign Up. You can now view and download portions of your medical record. 10. Click the Download Summary menu link to download a portable copy of your medical information. Additional Information    If you have questions, please visit the Frequently Asked Questions section of the [facility] website at [email or web address] Remember, [facility] is NOT to be used for urgent needs. For medical emergencies, dial 911.

## 2019-02-28 NOTE — LETTER
[facility] EMERGENCY DEPT 
[address] [facility] [id number] [phone] Work/School Note Date: 2/28/2019 To Whom It May concern: 
 
Kathy Hays was seen and treated today in the emergency room by the following provider(s): 
Attending Provider: David Calloway MD 
Nurse Practitioner: Bob Nunn NP. Kathy Hays may return to work on 03/04/2019. Sincerely, Jimmy Lawrence NP
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
## 2019-03-14 ENCOUNTER — HOSPITAL ENCOUNTER (OUTPATIENT)
Dept: PHYSICAL THERAPY | Age: 24
Discharge: HOME | End: 2019-03-14
Payer: COMMERCIAL

## 2019-03-14 PROCEDURE — 97110 THERAPEUTIC EXERCISES: CPT

## 2019-03-14 PROCEDURE — 97162 PT EVAL MOD COMPLEX 30 MIN: CPT

## 2019-03-14 PROCEDURE — 97014 ELECTRIC STIMULATION THERAPY: CPT
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
## 2019-03-14 NOTE — PROGRESS NOTES
PT DAILY TREATMENT NOTE 10-18    Patient Name: Angelica Avalos  Date:3/14/2019  : 1995  [x]  Patient  Verified  Payor: BLUE CROSS / Plan: [employer] [facility] / Product Type: PPO /    In time:730  Out time:0810  Total Treatment Time (min): 40  Visit #: 1 of 8    Medicare/BCBS Only   Total Timed Codes (min):  10 1:1 Treatment Time:  40       Treatment Area: Right hip pain [M25.551]    SUBJECTIVE  Pain Level (0-10 scale): 8  Any medication changes, allergies to medications, adverse drug reactions, diagnosis change, or new procedure performed?: [x] No    [] Yes (see summary sheet for update)  Subjective functional status/changes:   [] No changes reported       OBJECTIVE    Modality rationale: decrease inflammation and decrease pain to improve the patients ability to perform daily tasks   Min Type Additional Details   10 [] Estim:  [x]Unatt       [x]IFC  []Premod                        []Other:  [x]w/ice   []w/heat  Position: seated  Location: right lateral hip    [] Estim: []Att    []TENS instruct  []NMES                    []Other:  []w/US   []w/ice   []w/heat  Position:  Location:    []  Traction: [] Cervical       []Lumbar                       [] Prone          []Supine                       []Intermittent   []Continuous Lbs:  [] before manual  [] after manual    []  Ultrasound: []Continuous   [] Pulsed                           []1MHz   []3MHz W/cm2:  Location:    []  Iontophoresis with dexamethasone         Location: [] Take home patch   [] In clinic    []  Ice     []  heat  []  Ice massage  []  Laser   []  Anodyne Position:  Location:    []  Laser with stim  []  Other:  Position:  Location:    []  Vasopneumatic Device Pressure:       [] lo [] med [] hi   Temperature: [] lo [] med [] hi   [] Skin assessment post-treatment:  []intact []redness- no adverse reaction   []redness - adverse reaction:     20 min [x]Eval                  []Re-Eval       10 min Therapeutic Exercise:  [] See flow sheet : HEP Rationale: increase ROM and increase strength to improve the patients ability to perform daily tasks      With   [] TE   [] TA   [] neuro   [] other: Patient Education: [x] Review HEP    [] Progressed/Changed HEP based on:   [] positioning   [] body mechanics   [] transfers   [] heat/ice application    [] other:      Other Objective/Functional Measures:       Pain Level (0-10 scale) post treatment: 6    ASSESSMENT/Changes in Function:  Pt with diminished pain level following treatment    Patient will continue to benefit from skilled PT services to modify and progress therapeutic interventions, address functional mobility deficits, address ROM deficits, address strength deficits, analyze and address soft tissue restrictions and analyze and cue movement patterns to attain remaining goals.      [x]  See Plan of Care  []  See progress note/recertification  []  See Discharge Summary         Progress towards goals / Updated goals:  Per POC    PLAN  []  Upgrade activities as tolerated     []  Continue plan of care  []  Update interventions per flow sheet       []  Discharge due to:_  []  Other:_      Alexis Ruff PT 3/14/2019  7:28 AM    Future Appointments   Date Time Provider Adelso Louise   3/14/2019  7:30 AM Adonis Sanchez, PT [facility]

## 2019-03-18 ENCOUNTER — HOSPITAL ENCOUNTER (OUTPATIENT)
Dept: PHYSICAL THERAPY | Age: 24
Discharge: HOME | End: 2019-03-18
Payer: COMMERCIAL

## 2019-03-18 PROCEDURE — 97110 THERAPEUTIC EXERCISES: CPT

## 2019-03-18 PROCEDURE — 97014 ELECTRIC STIMULATION THERAPY: CPT

## 2019-03-18 PROCEDURE — 97140 MANUAL THERAPY 1/> REGIONS: CPT

## 2019-03-18 NOTE — PROGRESS NOTES
PT DAILY TREATMENT NOTE 10-18    Patient Name: Sara Breen  Date:3/18/2019  : 1995  [x]  Patient  Verified  Payor: BLUE CROSS / Plan: [employer] [facility] / Product Type: PPO /    In time:8:30  Out time:9:20  Total Treatment Time (min): 50  Visit #: 2 of 8    Medicare/BCBS Only   Total Timed Codes (min):  40 1:1 Treatment Time:  40       Treatment Area: Right hip pain [M25.551]    SUBJECTIVE  Pain Level (0-10 scale): 4/10  Any medication changes, allergies to medications, adverse drug reactions, diagnosis change, or new procedure performed?: [x] No    [] Yes (see summary sheet for update)  Subjective functional status/changes:   [] No changes reported  The patient reports compliance with HEP. She reports lateral discomfort through her right hip upon arrival.    OBJECTIVE  Modality rationale: decrease edema, decrease inflammation and decrease pain to improve the patients ability to improve ADL ease. Min Type Additional Details   10 [x] Estim:  [x]Unatt       [x]IFC  []Premod                        []Other:  [x]w/ice   []w/heat  Position: sitting  Location: right lateral hip   [] Skin assessment post-treatment:  []intact []redness- no adverse reaction    []redness - adverse reaction:     32 min Therapeutic Exercise:  [x] See flow sheet :   Rationale: increase ROM and increase strength to improve the patients ability to improve ADL ease. 8 min Manual Therapy: PROM right hip ER/ABD/IR/flexion   Rationale: decrease pain, increase ROM and increase tissue extensibility to improve ADL ease. With   [] TE   [] TA   [] neuro   [] other: Patient Education: [x] Review HEP    [] Progressed/Changed HEP based on:   [] positioning   [] body mechanics   [] transfers   [] heat/ice application    [] other:      Other Objective/Functional Measures:   First follow-up. No pain increased verbalized by patient during exercises, though ABD and ER passively was painful with fairly empty end feel.      Pain Level (0-10 scale) post treatment: 2/10    ASSESSMENT/Changes in Function: No pain increased verbalized by patient during exercises, though ABD and ER passively was painful with fairly empty end feel. Patient will continue to benefit from skilled PT services to modify and progress therapeutic interventions, address functional mobility deficits, address ROM deficits, address strength deficits, analyze and address soft tissue restrictions, analyze and cue movement patterns, analyze and modify body mechanics/ergonomics, assess and modify postural abnormalities and instruct in home and community integration to attain remaining goals. []  See Plan of Care  []  See progress note/recertification  []  See Discharge Summary         Progress towards goals / Updated goals:  Short Term Goals: To be accomplished in 1 weeks:               1. Pt will be I and compliant with HEP Met - pt reports compliance 3/18/2019  Long Term Goals: To be accomplished in 4 weeks:               1. Improve FOTO to 67 to improve ability for functional tasks. 2. Pt will reports no difficulty with getting in and out of a car. 3. Pt will report no difficulty with walking 2 blocks. 4. Pt will demonstrate at least 90 degrees of right hip AROM to improve ability for functional tasks.       PLAN  []  Upgrade activities as tolerated     [x]  Continue plan of care  []  Update interventions per flow sheet       []  Discharge due to:_  []  Other:_      Dianna Roberts, PT 3/18/2019  8:38 AM    Future Appointments   Date Time Provider Adelso Louise   3/20/2019  8:00 AM [place], [address] [facility]   3/25/2019  8:30 AM [facility]   3/29/2019  9:30 AM Jonathan Nino, PT [facility]   4/2/2019  8:00 AM Jonathan Nino, PT [facility]   4/4/2019  8:00 AM [place]ading [facility]   4/9/2019  8:00 AM Jonathan Nino, PT [facility]   4/11/2019  8:00 AM Jonathan Nino, PT [facility]

## 2019-03-20 ENCOUNTER — HOSPITAL ENCOUNTER (OUTPATIENT)
Dept: PHYSICAL THERAPY | Age: 24
Discharge: HOME | End: 2019-03-20
Payer: COMMERCIAL

## 2019-03-20 PROCEDURE — 97110 THERAPEUTIC EXERCISES: CPT

## 2019-03-20 PROCEDURE — 97140 MANUAL THERAPY 1/> REGIONS: CPT

## 2019-03-20 NOTE — PROGRESS NOTES
PT DAILY TREATMENT NOTE 10-18    Patient Name: Supriya Joseph  Date:3/20/2019  : 1995  [x]  Patient  Verified  Payor: BLUE CROSS / Plan: [employer] [facility] / Product Type: PPO /    In time:7:57  Out time:8:28  Total Treatment Time (min): 31  Visit #: 3 of 8    Medicare/BCBS Only   Total Timed Codes (min):  31 1:1 Treatment Time:  31       Treatment Area: Right hip pain [M25.551]    SUBJECTIVE  Pain Level (0-10 scale): 0  Any medication changes, allergies to medications, adverse drug reactions, diagnosis change, or new procedure performed?: [x] No    [] Yes (see summary sheet for update)  Subjective functional status/changes:   [] No changes reported  Pt denies pain upon arrival, reports no adverse response to first f/u session    OBJECTIVE    Modality rationale: PD to improve the patients ability to    Min Type Additional Details    [] Estim:  []Unatt       []IFC  []Premod                        []Other:  []w/ice   []w/heat  Position:  Location:    [] Estim: []Att    []TENS instruct  []NMES                    []Other:  []w/US   []w/ice   []w/heat  Position:  Location:    []  Traction: [] Cervical       []Lumbar                       [] Prone          []Supine                       []Intermittent   []Continuous Lbs:  [] before manual  [] after manual    []  Ultrasound: []Continuous   [] Pulsed                           []1MHz   []3MHz W/cm2:  Location:    []  Iontophoresis with dexamethasone         Location: [] Take home patch   [] In clinic    []  Ice     []  heat  []  Ice massage  []  Laser   []  Anodyne Position:  Location:    []  Laser with stim  []  Other:  Position:  Location:    []  Vasopneumatic Device Pressure:       [] lo [] med [] hi   Temperature: [] lo [] med [] hi   [] Skin assessment post-treatment:  []intact []redness- no adverse reaction    []redness - adverse reaction:       23 min Therapeutic Exercise:  [] See flow sheet :   Rationale: increase ROM and increase strength to improve the patients ability to perform daily tasks and work duties      8 min Manual Therapy:  Right hip PROM flex/abd/add/ER&IR   Rationale: increase ROM and increase tissue extensibility to improve joint mobility with ADLs          With   [] TE   [] TA   [] neuro   [] other: Patient Education: [x] Review HEP    [] Progressed/Changed HEP based on:   [] positioning   [] body mechanics   [] transfers   [] heat/ice application    [] other:      Other Objective/Functional Measures: attempted to progress heel slides to HS with small SB but pt reports some discomfort through anterolateral hip, thus continued with heel slides     Pain Level (0-10 scale) post treatment: 2    ASSESSMENT/Changes in Function: Pt with decreased pain reports today, she only reports discomfort with attempts at SB HS curls. Pt frequently asked regarding pain and declines discomfort consistently. She reports slight increase in pain level at end of session near TFL/hip flexor secondary to attempts at SB curl. Progress into standing activity as tolerated next session if pain remains minimal    Patient will continue to benefit from skilled PT services to modify and progress therapeutic interventions, address functional mobility deficits, address ROM deficits, address strength deficits, analyze and address soft tissue restrictions, analyze and cue movement patterns and analyze and modify body mechanics/ergonomics to attain remaining goals. []  See Plan of Care  []  See progress note/recertification  []  See Discharge Summary         Progress towards goals / Updated goals:  Short Term Goals: To be accomplished in 1 weeks:               1. Pt will be I and compliant with HEP Met - pt reports compliance 3/18/2019  Long Term Goals: To be accomplished in 4 weeks:               1.  Improve FOTO to 67 to improve ability for functional tasks.               2. Pt will reports no difficulty with getting in and out of a car.               3. Pt will report no difficulty with walking 2 blocks.                4. Pt will demonstrate at least 90 degrees of right hip AROM to improve ability for functional Met >100 deg hip flexion AROM without pain 3/20/19    PLAN  []  Upgrade activities as tolerated     [x]  Continue plan of care  []  Update interventions per flow sheet       []  Discharge due to:_  []  Other:_      Madison Flank, DPT, CMTPT 3/20/2019  7:58 AM    Future Appointments   Date Time Provider Adelso Aspen   3/20/2019  8:00 AM Carloz [email or web address] [facility]   3/25/2019  8:30 AM Shwetha Bond, PT [facility]   3/29/2019  9:30 AM Deyvi Rosas, PT [facility]   4/2/2019  8:00 AM Deyvi Rosas, PT [facility]   4/4/2019  8:00 AM Poly Hernandez [facility]   4/9/2019  8:00 AM Deyvi Rosas, PT [facility]   4/11/2019  8:00 AM Deyvi Rosas, PT [facility]

## 2019-03-25 ENCOUNTER — HOSPITAL ENCOUNTER (OUTPATIENT)
Dept: PHYSICAL THERAPY | Age: 24
Discharge: HOME | End: 2019-03-25
Payer: COMMERCIAL

## 2019-03-25 PROCEDURE — 97110 THERAPEUTIC EXERCISES: CPT

## 2019-03-25 NOTE — PROGRESS NOTES
PT DAILY TREATMENT NOTE 10-18    Patient Name: Samantha Dougherty  Date:3/25/2019  : 1995  [x]  Patient  Verified  Payor: BLUE CROSS / Plan: [employer] [facility] / Product Type: PPO /    In time:8:30  Out time:9:02  Total Treatment Time (min): 32  Visit #: 4 of 8    Medicare/BCBS Only   Total Timed Codes (min):  32 1:1 Treatment Time:  32       Treatment Area: Right hip pain [M25.551]    SUBJECTIVE  Pain Level (0-10 scale): 0/10  Any medication changes, allergies to medications, adverse drug reactions, diagnosis change, or new procedure performed?: [x] No    [] Yes (see summary sheet for update)  Subjective functional status/changes:   [] No changes reported  The patient reports that her hip has been feeling pretty good lately. OBJECTIVE  32 min Therapeutic Exercise:  [] See flow sheet :   Rationale: increase ROM and increase strength to improve the patients ability to improve ADL ease. With   [] TE   [] TA   [] neuro   [] other: Patient Education: [x] Review HEP    [] Progressed/Changed HEP based on:   [] positioning   [] body mechanics   [] transfers   [] heat/ice application    [] other:      Other Objective/Functional Measures: Added hip x 3 with light resistance, mini squats, step ups, clams, bridges, and progressed hip mobility exercises during session    Pain Level (0-10 scale) post treatment: 0/10    ASSESSMENT/Changes in Function: Progressed program to include standing exercises with excellent tolerance reported throughout session and not limited by pain. Issued updated HEP. The patient completed the session void of pain reports and left the clinic in no apparent distress.      Patient will continue to benefit from skilled PT services to modify and progress therapeutic interventions, address functional mobility deficits, address ROM deficits, address strength deficits, analyze and address soft tissue restrictions, analyze and cue movement patterns, analyze and modify body mechanics/ergonomics, assess and modify postural abnormalities and instruct in home and community integration to attain remaining goals. []  See Plan of Care  []  See progress note/recertification  []  See Discharge Summary         Progress towards goals / Updated goals:  Short Term Goals: To be accomplished in 1 weeks:               1. Pt will be I and compliant with HEP Met - pt reports compliance 3/18/2019  Long Term Goals: To be accomplished in 4 weeks:               1.  Improve FOTO to 67 to improve ability for functional tasks.               2. Pt will reports no difficulty with getting in and out of a car.               3. Pt will report no difficulty with walking 2 blocks.                4. Pt will demonstrate at least 90 degrees of right hip AROM to improve ability for functional Met >100 deg hip flexion AROM without pain 3/20/19        PLAN  []  Upgrade activities as tolerated     [x]  Continue plan of care  []  Update interventions per flow sheet       []  Discharge due to:_  []  Other:_      Bo Hill, PT 3/25/2019  8:35 AM    Future Appointments   Date Time Provider Adelso Louise   3/29/2019  9:30 AM Willda Kae, PT [facility]   4/2/2019  8:00 AM Willda Kae, PT [facility]   4/4/2019  8:00 AM Neil Fleischer [facility]   4/9/2019  8:00 AM Paulynda Kae, PT [facility]   4/11/2019  8:00 AM Gwrobinsonda Kae, PT [facility]

## 2019-03-29 ENCOUNTER — HOSPITAL ENCOUNTER (OUTPATIENT)
Dept: PHYSICAL THERAPY | Age: 24
Discharge: HOME | End: 2019-03-29
Payer: COMMERCIAL

## 2019-03-29 PROCEDURE — 97110 THERAPEUTIC EXERCISES: CPT

## 2019-03-29 NOTE — PROGRESS NOTES
PT DAILY TREATMENT NOTE 10-18    Patient Name: Weston Musa  Date:3/29/2019  : 1995  [x]  Patient  Verified  Payor: BLUE CROSS / Plan: [employer] [facility] [place] / Product Type: PPO /    In time:9:30  Out time: 9:55  Total Treatment Time (min): 25   Visit #: 5 of 8    Medicare/BCBS Only   Total Timed Codes (min): 25  1:1 Treatment Time:  25       Treatment Area: Right hip pain [M25.551]    SUBJECTIVE  Pain Level (0-10 scale): 0/10  Any medication changes, allergies to medications, adverse drug reactions, diagnosis change, or new procedure performed?: [x] No    [] Yes (see summary sheet for update)  Subjective functional status/changes:   [] No changes reported  Pt reports she has not been having much pain recently. OBJECTIVE  25 min Therapeutic Exercise:  [x] See flow sheet :   Rationale: increase ROM and increase strength to improve the patients ability to improve ADL ease. With   [] TE   [] TA   [] neuro   [] other: Patient Education: [x] Review HEP    [] Progressed/Changed HEP based on:   [] positioning   [] body mechanics   [] transfers   [] heat/ice application    [] other:      Other Objective/Functional Measures: no changes to therex. Pain Level (0-10 scale) post treatment: 0/10    ASSESSMENT/Changes in Function:  Pt with minor irritation during SLR, otherwise no complaints with all therex. Pt is progressing well with improved ROM and diminished. Patient will continue to benefit from skilled PT services to modify and progress therapeutic interventions, address functional mobility deficits, address ROM deficits, address strength deficits, analyze and address soft tissue restrictions, analyze and cue movement patterns, analyze and modify body mechanics/ergonomics, assess and modify postural abnormalities and instruct in home and community integration to attain remaining goals.      []  See Plan of Care  []  See progress note/recertification  []  See Discharge Summary Progress towards goals / Updated goals:  Short Term Goals: To be accomplished in 1 weeks:               1. Pt will be I and compliant with HEP Met - pt reports compliance 3/18/2019  Long Term Goals: To be accomplished in 4 weeks:               1. Improve FOTO to 67 to improve ability for functional tasks.               2. Pt will reports no difficulty with getting in and out of a car. - pt reports no difficulty 3-29-19               3. Pt will report no difficulty with walking 2 blocks.                4. Pt will demonstrate at least 90 degrees of right hip AROM to improve ability for functional Met >100 deg hip flexion AROM without pain 3/20/19        PLAN  []  Upgrade activities as tolerated     [x]  Continue plan of care  []  Update interventions per flow sheet       []  Discharge due to:_  []  Other:_      Mar Reyes, MARTHA 3/29/2019  9:35 AM    Future Appointments   Date Time Provider Adelso Aspen   3/29/2019  9:30 AM [facility]   4/2/2019  8:00 AM Lynn Tate PT [facility]   4/4/2019  8:00 AM Caleb Segura [facility]   4/9/2019  8:00 AM Lynn Tate PT [facility]   4/11/2019  8:00 AM Lynn Tate PT [facility]   5/3/2019  9:00 AM Ashok Goldberg RD [facility]   5/3/2019  1:00 PM [place]

## 2019-04-02 ENCOUNTER — APPOINTMENT (OUTPATIENT)
Dept: PHYSICAL THERAPY | Age: 24
End: 2019-04-02

## 2019-04-04 ENCOUNTER — APPOINTMENT (OUTPATIENT)
Dept: PHYSICAL THERAPY | Age: 24
End: 2019-04-04

## 2019-04-09 ENCOUNTER — APPOINTMENT (OUTPATIENT)
Dept: PHYSICAL THERAPY | Age: 24
End: 2019-04-09

## 2019-04-11 ENCOUNTER — APPOINTMENT (OUTPATIENT)
Dept: PHYSICAL THERAPY | Age: 24
End: 2019-04-11

## 2019-04-26 ENCOUNTER — APPOINTMENT (OUTPATIENT)
Dept: NUTRITION | Age: 24
End: 2019-04-26

## 2019-05-03 ENCOUNTER — HOSPITAL ENCOUNTER (OUTPATIENT)
Dept: NUTRITION | Age: 24
Discharge: HOME | End: 2019-05-03
Payer: COMMERCIAL

## 2019-05-03 PROCEDURE — 97802 MEDICAL NUTRITION INDIV IN: CPT

## 2019-05-03 NOTE — PROGRESS NOTES
[facility]  [address] [facility], [address] [address] [address]  Phone: [phone]  Fax: [phone]   Nutrition Assessment - Medical Nutrition Therapy   Outpatient Initial Evaluation         Patient Name: Charolette Galeazzi : 1995   Treatment Diagnosis: Abnormal weight gain   Referral Source: Joanna Myers MD Start of [facility]): 5/3/2019     Gender: female Age: 22 y.o. Ht: 64.5 in Wt: 247  lb  kg   BMI: 39 BMR   Male  BMR Female    Anthropometrics Assessment: Per BMI, pt is considered morbidly obese. Moderate abdominal adiposity is evident based on visual observation     Past Medical History includes: Bipolar disorder (controlled)     Pertinent Medications:   Abilify, Concerta     Biochemical Data:   No results found for: HBA1C, HGBE8, JXG5MTEE, BWD9ICCH  No results found for: CHOL, CHOLPOCT, CHOLX, CHLST, CHOLV, HDL, HDLPOC, LDL, LDLCPOC, LDLC, DLDLP, VLDLC, VLDL, TGLX, TRIGL, TRIGP, TGLPOCT, CHHD, CHHDX  Lab Results   Component Value Date/Time    ALT (SGPT) 21 2018 03:40 PM    AST (SGOT) 15 2018 03:40 PM    Alk. phosphatase 113 2018 03:40 PM    Bilirubin, direct 0.1 2017 06:48 PM    Bilirubin, total 0.3 2018 03:40 PM     Lab Results   Component Value Date/Time    Creatinine 0.71 2018 03:40 PM     Lab Results   Component Value Date/Time    BUN 9 2018 03:40 PM     No results found for: MCACR, MCA1, MCA2, MCA3, MCAU, MCAU2, MCALPOCT     Subjective/Assessment:   Pt has been working on losing weight and has hit a plateau. She is down 100# and can't seem to break 245#. She is on abilify (Anti-psych) and Concerta (ADHD) which suppresses her appetite so she is only eating 1-2 times per day. Inconsistent eating habits plus poor food/meal balance are contributing factors to her plateau. She also drinks sweet tea and Gatorade, water as well. Discussed balance and sugar free beverages only.  She is exercising ~ 4 days per week, mostly cardio. She does not like a lot of sweets, but she will choose a frozen dinner meal out of convenience as oppose to something made at home. She is living with her mom who is here with her today. She works full-time with a consistent schedule M-F ([employer] - 11a-8p) Pt is motivated to make changes, established realistic goals, and expect good compliance. Will follow-up in a month. Current Eating Patterns: L: annie Moss mac and cheese  S: apples from [employer]  MARLO: 40 oz water, 12oz gatorade, sweet tea occasionally     Estimate Needs   Calories: 1750  Protein: 153 Carbs: 153 Fat: 58   Kcal/day  g/day  g/day  g/day        percent: 35  35  30               Education & Recommendations provided:  Educated pt on carbohydrate food sources, counting carbohydrates, label reading, meal timing, and appropriate serving sizes. Encouraged pt to avoid sugary beverages.  Meal builder tool, balance of Jose, packing lunch and planning and prepping foods ahead of time   Handouts Provided: [x]  Carbohydrates  [x]  Protein  []  Fiber  [x]  Serving Sizes  [x]  Meal and Snack Ideas  [x]  Food Journals []  Diabetes  []  Cholesterol  []  Sodium  []  Gen Nutr Guidelines  []  SBGM Guidelines  []  Others:   Information Reviewed with: Patient and mom   Readiness to Change Stage: []  Pre-contemplative    []  Contemplative  []  Preparation               [x]  Action                  []  Maintenance   Potential Barriers to Learning: []  Decline in memory    []  Language barrier   []  Other:  []  Emotional                  []  Limited mobility  []  Lack of motivation     [] Vision, hearing or cognitive impairment   Expected Compliance: Good      Nutritional Goal - To promote lifestyle changes to result in:    [x]  Weight loss  []  Improved diabetic control  []  Decreased cholesterol levels  []  Decreased blood pressure  []  Weight maintenance []  Preventing any interactions associated with food allergies  []  Adequate weight gain toward goal weight  []  Other:        Patient Goals:  SMART goals 1. Eat 3 meals per day with optional snack; use example timeline to space appropriately  2. Focus on protein sources first, non-starcy veg next,and carbs last' pack dinner to take to work. 3. Plan ahead - map out meals for the week, if you meal prep at least make the protein option  4. Maintain a paper/pen food journal; write out meals ahead of time, creat shoppin glists, etc  5. Sugar free luana and water :  oz per day.       Dietitian Signature: Александр Schofield MA, RD Date: 5/3/2019   Follow-up: 6 Agustin @ 0900 Time: 8:47 AM

## 2019-06-06 ENCOUNTER — HOSPITAL ENCOUNTER (OUTPATIENT)
Dept: NUTRITION | Age: 24
Discharge: HOME | End: 2019-06-06
Payer: COMMERCIAL

## 2019-06-06 PROCEDURE — 97803 MED NUTRITION INDIV SUBSEQ: CPT

## 2019-06-06 NOTE — PROGRESS NOTES
NUTRITION - FOLLOW-UP TREATMENT NOTE  Patient Name: Harper Barker         Date: 2019  : 1995    YES/ Patient  Verified  Diagnosis: Abnormal weight gain   In time:   09             Out time:   930   Total Treatment Time (min):   30     SUBJECTIVE/ASSESSMENT    Changes in medication or medical history? Any new allergies, surgeries or procedures? NO    If yes, update Summary List   No change         Current Wt: 244 Previous Wt: 247 Wt Change: -3     Achievement of Goals: Pt has been okay since last appointment, working on eating 3 meals per day (one meal being a protein shake). She has been dealing with bad allergies and sinus drainage/being sick for a couple of weeks and that is making it difficult to stay on track. 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 She has not been sleeping well and this makes her more fatigued during the day. She is going back to school and started back at her old job as a [employer] , part-time. Reviewed recommendations/goal from last appointment and anticipate better result once she is feeling better.           Patient Education:  [x]  Review current plan with patient   []  Other:    Handouts/  Information Provided: []  Carbohydrates  []  Protein  []  Fiber  []  Serving Sizes  []  Fluids  []  General guidelines []  Diabetes  []  Cholesterol  []  Sodium  []  SBGM  []  Food Journals  []  Others:      New Patient Goals: -  Can use liquid nutritions (smoothie/protein shake) as one meal replacement per day  - Once feeling better; get refocused on goals and stay consistent with them     PLAN    [x]  Continue on current plan []  Follow-up PRN   []  Discharge due to :    [x]  Next appt:  @ 0900     Dietitian: Emmanuel Gupta MA, RD    Date: 2019 Time: 8:41 AM

## 2019-06-16 ENCOUNTER — HOSPITAL ENCOUNTER (EMERGENCY)
Age: 24
Discharge: HOME | End: 2019-06-16
Attending: EMERGENCY MEDICINE | Admitting: EMERGENCY MEDICINE
Payer: COMMERCIAL

## 2019-06-16 VITALS
SYSTOLIC BLOOD PRESSURE: 128 MMHG | TEMPERATURE: 98.9 F | RESPIRATION RATE: 16 BRPM | HEART RATE: 98 BPM | BODY MASS INDEX: 41.63 KG/M2 | OXYGEN SATURATION: 100 % | DIASTOLIC BLOOD PRESSURE: 70 MMHG | WEIGHT: 235 LBS

## 2019-06-16 DIAGNOSIS — R10.84 CHRONIC GENERALIZED ABDOMINAL PAIN: Primary | ICD-10-CM

## 2019-06-16 DIAGNOSIS — M79.7 FIBROMYALGIA: ICD-10-CM

## 2019-06-16 DIAGNOSIS — G89.29 CHRONIC GENERALIZED ABDOMINAL PAIN: Primary | ICD-10-CM

## 2019-06-16 LAB
ALBUMIN SERPL-MCNC: 3.5 G/DL (ref 3.4–5)
ALBUMIN/GLOB SERPL: 1 {RATIO} (ref 0.8–1.7)
ALP SERPL-CCNC: 89 U/L (ref 45–117)
ALT SERPL-CCNC: 20 U/L (ref 13–56)
ANION GAP SERPL CALC-SCNC: 9 MMOL/L (ref 3–18)
APPEARANCE UR: ABNORMAL
AST SERPL-CCNC: 12 U/L (ref 15–37)
BACTERIA URNS QL MICRO: ABNORMAL /HPF
BASOPHILS # BLD: 0 K/UL (ref 0–0.1)
BASOPHILS NFR BLD: 0 % (ref 0–2)
BILIRUB SERPL-MCNC: 0.3 MG/DL (ref 0.2–1)
BILIRUB UR QL: NEGATIVE
BUN SERPL-MCNC: 8 MG/DL (ref 7–18)
BUN/CREAT SERPL: 10 (ref 12–20)
CALCIUM SERPL-MCNC: 9.2 MG/DL (ref 8.5–10.1)
CHLORIDE SERPL-SCNC: 108 MMOL/L (ref 100–108)
CO2 SERPL-SCNC: 25 MMOL/L (ref 21–32)
COLOR UR: YELLOW
CREAT SERPL-MCNC: 0.78 MG/DL (ref 0.6–1.3)
DIFFERENTIAL METHOD BLD: NORMAL
EOSINOPHIL # BLD: 0.1 K/UL (ref 0–0.4)
EOSINOPHIL NFR BLD: 1 % (ref 0–5)
EPITH CASTS URNS QL MICRO: ABNORMAL /LPF (ref 0–5)
ERYTHROCYTE [DISTWIDTH] IN BLOOD BY AUTOMATED COUNT: 13.9 % (ref 11.6–14.5)
GLOBULIN SER CALC-MCNC: 3.4 G/DL (ref 2–4)
GLUCOSE SERPL-MCNC: 120 MG/DL (ref 74–99)
GLUCOSE UR STRIP.AUTO-MCNC: NEGATIVE MG/DL
HCG UR QL: NEGATIVE
HCT VFR BLD AUTO: 39.8 % (ref 35–45)
HGB BLD-MCNC: 13.3 G/DL (ref 12–16)
HGB UR QL STRIP: NEGATIVE
KETONES UR QL STRIP.AUTO: NEGATIVE MG/DL
LACTATE BLD-SCNC: 1.45 MMOL/L (ref 0.4–2)
LEUKOCYTE ESTERASE UR QL STRIP.AUTO: ABNORMAL
LIPASE SERPL-CCNC: 161 U/L (ref 73–393)
LYMPHOCYTES # BLD: 2.1 K/UL (ref 0.9–3.6)
LYMPHOCYTES NFR BLD: 21 % (ref 21–52)
MCH RBC QN AUTO: 28.4 PG (ref 24–34)
MCHC RBC AUTO-ENTMCNC: 33.4 G/DL (ref 31–37)
MCV RBC AUTO: 85 FL (ref 74–97)
MONOCYTES # BLD: 0.5 K/UL (ref 0.05–1.2)
MONOCYTES NFR BLD: 5 % (ref 3–10)
NEUTS SEG # BLD: 7.5 K/UL (ref 1.8–8)
NEUTS SEG NFR BLD: 73 % (ref 40–73)
NITRITE UR QL STRIP.AUTO: NEGATIVE
PH UR STRIP: 7 [PH] (ref 5–8)
PLATELET # BLD AUTO: 235 K/UL (ref 135–420)
PMV BLD AUTO: 11.1 FL (ref 9.2–11.8)
POTASSIUM SERPL-SCNC: 4 MMOL/L (ref 3.5–5.5)
PROT SERPL-MCNC: 6.9 G/DL (ref 6.4–8.2)
PROT UR STRIP-MCNC: NEGATIVE MG/DL
RBC # BLD AUTO: 4.68 M/UL (ref 4.2–5.3)
RBC #/AREA URNS HPF: ABNORMAL /HPF (ref 0–5)
SODIUM SERPL-SCNC: 142 MMOL/L (ref 136–145)
SP GR UR REFRACTOMETRY: 1.02 (ref 1–1.03)
UROBILINOGEN UR QL STRIP.AUTO: 1 EU/DL (ref 0.2–1)
WBC # BLD AUTO: 10.2 K/UL (ref 4.6–13.2)
WBC URNS QL MICRO: ABNORMAL /HPF (ref 0–4)

## 2019-06-16 PROCEDURE — 85025 COMPLETE CBC W/AUTO DIFF WBC: CPT

## 2019-06-16 PROCEDURE — 81001 URINALYSIS AUTO W/SCOPE: CPT

## 2019-06-16 PROCEDURE — 83605 ASSAY OF LACTIC ACID: CPT

## 2019-06-16 PROCEDURE — 74011250637 HC RX REV CODE- 250/637: Performed by: EMERGENCY MEDICINE

## 2019-06-16 PROCEDURE — 81025 URINE PREGNANCY TEST: CPT

## 2019-06-16 PROCEDURE — 80053 COMPREHEN METABOLIC PANEL: CPT

## 2019-06-16 PROCEDURE — 99284 EMERGENCY DEPT VISIT MOD MDM: CPT

## 2019-06-16 PROCEDURE — 83690 ASSAY OF LIPASE: CPT

## 2019-06-16 RX ORDER — IBUPROFEN 400 MG/1
800 TABLET ORAL
Status: COMPLETED | OUTPATIENT
Start: 2019-06-16 | End: 2019-06-16

## 2019-06-16 RX ORDER — DICYCLOMINE HYDROCHLORIDE 20 MG/1
20 TABLET ORAL EVERY 6 HOURS
Qty: 20 TAB | Refills: 0 | Status: SHIPPED | OUTPATIENT
Start: 2019-06-16 | End: 2019-06-21

## 2019-06-16 RX ORDER — AZITHROMYCIN 500 MG/1
500 TABLET, FILM COATED ORAL
COMMUNITY
End: 2019-07-14

## 2019-06-16 RX ORDER — ONDANSETRON 8 MG/1
8 TABLET, ORALLY DISINTEGRATING ORAL
Status: COMPLETED | OUTPATIENT
Start: 2019-06-16 | End: 2019-06-16

## 2019-06-16 RX ORDER — METHYLPHENIDATE HYDROCHLORIDE 18 MG/1
TABLET ORAL
COMMUNITY

## 2019-06-16 RX ORDER — KETOROLAC TROMETHAMINE 30 MG/ML
30 INJECTION, SOLUTION INTRAMUSCULAR; INTRAVENOUS
Status: DISCONTINUED | OUTPATIENT
Start: 2019-06-16 | End: 2019-06-16

## 2019-06-16 RX ORDER — ARIPIPRAZOLE 5 MG/1
TABLET ORAL
COMMUNITY

## 2019-06-16 RX ORDER — GABAPENTIN 300 MG/1
300 CAPSULE ORAL 3 TIMES DAILY
COMMUNITY
End: 2020-11-17

## 2019-06-16 RX ORDER — ONDANSETRON 8 MG/1
8 TABLET, ORALLY DISINTEGRATING ORAL
Qty: 10 TAB | Refills: 0 | Status: SHIPPED | OUTPATIENT
Start: 2019-06-16 | End: 2019-07-14

## 2019-06-16 RX ORDER — MORPHINE SULFATE 4 MG/ML
4 INJECTION INTRAVENOUS
Status: DISCONTINUED | OUTPATIENT
Start: 2019-06-16 | End: 2019-06-16

## 2019-06-16 RX ORDER — OXYCODONE AND ACETAMINOPHEN 5; 325 MG/1; MG/1
1 TABLET ORAL
Status: COMPLETED | OUTPATIENT
Start: 2019-06-16 | End: 2019-06-16

## 2019-06-16 RX ADMIN — ONDANSETRON 8 MG: 8 TABLET, ORALLY DISINTEGRATING ORAL at 12:14

## 2019-06-16 RX ADMIN — IBUPROFEN 800 MG: 400 TABLET, FILM COATED ORAL at 12:14

## 2019-06-16 RX ADMIN — OXYCODONE HYDROCHLORIDE AND ACETAMINOPHEN 1 TABLET: 5; 325 TABLET ORAL at 12:14

## 2019-06-16 NOTE — DISCHARGE INSTRUCTIONS
Abdominal Pain: Care Instructions  Your Care Instructions    Abdominal pain has many possible causes. Some aren't serious and get better on their own in a few days. Others need more testing and treatment. If your pain continues or gets worse, you need to be rechecked and may need more tests to find out what is wrong. You may need surgery to correct the problem. Don't ignore new symptoms, such as fever, nausea and vomiting, urination problems, pain that gets worse, and dizziness. These may be signs of a more serious problem. Your doctor may have recommended a follow-up visit in the next 8 to 12 hours. If you are not getting better, you may need more tests or treatment. The doctor has checked you carefully, but problems can develop later. If you notice any problems or new symptoms, get medical treatment right away. Follow-up care is a key part of your treatment and safety. Be sure to make and go to all appointments, and call your doctor if you are having problems. It's also a good idea to know your test results and keep a list of the medicines you take. How can you care for yourself at home? · Rest until you feel better. · To prevent dehydration, drink plenty of fluids, enough so that your urine is light yellow or clear like water. Choose water and other caffeine-free clear liquids until you feel better. If you have kidney, heart, or liver disease and have to limit fluids, talk with your doctor before you increase the amount of fluids you drink. · If your stomach is upset, eat mild foods, such as rice, dry toast or crackers, bananas, and applesauce. Try eating several small meals instead of two or three large ones. · Wait until 48 hours after all symptoms have gone away before you have spicy foods, alcohol, and drinks that contain caffeine. · Do not eat foods that are high in fat. · Avoid anti-inflammatory medicines such as aspirin, ibuprofen (Advil, Motrin), and naproxen (Aleve).  These can cause stomach upset. Talk to your doctor if you take daily aspirin for another health problem. When should you call for help? Call 911 anytime you think you may need emergency care. For example, call if:    · You passed out (lost consciousness).     · You pass maroon or very bloody stools.     · You vomit blood or what looks like coffee grounds.     · You have new, severe belly pain.    Call your doctor now or seek immediate medical care if:    · Your pain gets worse, especially if it becomes focused in one area of your belly.     · You have a new or higher fever.     · Your stools are black and look like tar, or they have streaks of blood.     · You have unexpected vaginal bleeding.     · You have symptoms of a urinary tract infection. These may include:  ? Pain when you urinate. ? Urinating more often than usual.  ? Blood in your urine.     · You are dizzy or lightheaded, or you feel like you may faint.    Watch closely for changes in your health, and be sure to contact your doctor if:    · You are not getting better after 1 day (24 hours). Where can you learn more? Go to http://rubaAffinity Systemssilvia.info/. Enter G829 in the search box to learn more about \"Abdominal Pain: Care Instructions. \"  Current as of: September 23, 2018  Content Version: 11.9  © 8617-4511 [employer]. Care instructions adapted under license by [employer] (which disclaims liability or warranty for this information). If you have questions about a medical condition or this instruction, always ask your healthcare professional. [place] [address] any warranty or liability for your use of this information. Fibromyalgia: Care Instructions  Your Care Instructions    Fibromyalgia is a painful condition that is not completely understood by medical experts. The cause of fibromyalgia is not known. It can make you feel tired and ache all over.  It causes tender spots at specific points of the body that hurt only when you press on them. You may have trouble sleeping, as well as other symptoms. These problems can upset your work and home life. Symptoms tend to come and go, although they may never go away completely. Fibromyalgia does not harm your muscles, joints, or organs. Follow-up care is a key part of your treatment and safety. Be sure to make and go to all appointments, and call your doctor if you are having problems. It's also a good idea to know your test results and keep a list of the medicines you take. How can you care for yourself at home? · Exercise often. Walk, swim, or bike to help with pain and sleep problems and to make you feel better. · Try to get a good night's sleep. Go to bed and get up at the same time each day, whether you feel rested or not. Make sure you have a good mattress and pillow. · Reduce stress. Avoid things that cause you stress, if you can. If not, work at making them less stressful. Learn to use biofeedback, guided imagery, meditation, or other methods to relax. · Make healthy changes. Eat a balanced diet, quit smoking, and limit alcohol and caffeine. · Use a heating pad set on low or take warm baths or showers for pain. Using cold packs for up to 20 minutes at a time can also relieve pain. Put a thin cloth between the cold pack and your skin. A gentle massage might help too. · Be safe with medicines. Take your medicines exactly as prescribed. Call your doctor if you think you are having a problem with your medicine. Your doctor may talk to you about taking antidepressant medicines. These medicines may improve sleep, relieve pain, and in some cases treat depression. · Learn about fibromyalgia. This makes coping easier. Then, take an active role in your treatment. · Think about joining a support group with others who have fibromyalgia to learn more and get support. When should you call for help?   Watch closely for changes in your health, and be sure to contact your doctor if:    · You feel sad, helpless, or hopeless; lose interest in things you used to enjoy; or have other symptoms of depression.     · Your fibromyalgia symptoms get worse. Where can you learn more? Go to http://ruba-silvia.info/. Enter V003 in the search box to learn more about \"Fibromyalgia: Care Instructions. \"  Current as of: Ruthann 3, 2018  Content Version: 11.9  © 2006-2018 [employer]. Care instructions adapted under license by [employer] (which disclaims liability or warranty for this information). If you have questions about a medical condition or this instruction, always ask your healthcare professional. [place] [address] any warranty or liability for your use of this information. Patient Education        Abdominal Pain: Care Instructions  Your Care Instructions    Abdominal pain has many possible causes. Some aren't serious and get better on their own in a few days. Others need more testing and treatment. If your pain continues or gets worse, you need to be rechecked and may need more tests to find out what is wrong. You may need surgery to correct the problem. Don't ignore new symptoms, such as fever, nausea and vomiting, urination problems, pain that gets worse, and dizziness. These may be signs of a more serious problem. Your doctor may have recommended a follow-up visit in the next 8 to 12 hours. If you are not getting better, you may need more tests or treatment. The doctor has checked you carefully, but problems can develop later. If you notice any problems or new symptoms, get medical treatment right away. Follow-up care is a key part of your treatment and safety. Be sure to make and go to all appointments, and call your doctor if you are having problems. It's also a good idea to know your test results and keep a list of the medicines you take. How can you care for yourself at home?   · Rest until you feel better. · To prevent dehydration, drink plenty of fluids, enough so that your urine is light yellow or clear like water. Choose water and other caffeine-free clear liquids until you feel better. If you have kidney, heart, or liver disease and have to limit fluids, talk with your doctor before you increase the amount of fluids you drink. · If your stomach is upset, eat mild foods, such as rice, dry toast or crackers, bananas, and applesauce. Try eating several small meals instead of two or three large ones. · Wait until 48 hours after all symptoms have gone away before you have spicy foods, alcohol, and drinks that contain caffeine. · Do not eat foods that are high in fat. · Avoid anti-inflammatory medicines such as aspirin, ibuprofen (Advil, Motrin), and naproxen (Aleve). These can cause stomach upset. Talk to your doctor if you take daily aspirin for another health problem. When should you call for help? Call 911 anytime you think you may need emergency care. For example, call if:    · You passed out (lost consciousness).     · You pass maroon or very bloody stools.     · You vomit blood or what looks like coffee grounds.     · You have new, severe belly pain.    Call your doctor now or seek immediate medical care if:    · Your pain gets worse, especially if it becomes focused in one area of your belly.     · You have a new or higher fever.     · Your stools are black and look like tar, or they have streaks of blood.     · You have unexpected vaginal bleeding.     · You have symptoms of a urinary tract infection. These may include:  ? Pain when you urinate. ? Urinating more often than usual.  ? Blood in your urine.     · You are dizzy or lightheaded, or you feel like you may faint.    Watch closely for changes in your health, and be sure to contact your doctor if:    · You are not getting better after 1 day (24 hours). Where can you learn more? Go to http://soledad.info/.   Enter I735 in the search box to learn more about \"Abdominal Pain: Care Instructions. \"  Current as of: September 23, 2018  Content Version: 11.9  © 5274-1090 [employer]. Care instructions adapted under license by [employer] (which disclaims liability or warranty for this information). If you have questions about a medical condition or this instruction, always ask your healthcare professional. [place] [address] any warranty or liability for your use of this information.

## 2019-06-16 NOTE — ED NOTES
Daria Pineda is a 22 y.o. female that was discharged in stable. Pt was accompanied by mother. Pt is not driving. The patients diagnosis, condition and treatment were explained to  patient and aftercare instructions were given. The patient verbalized understanding. Patient armband removed and shredded.

## 2019-06-16 NOTE — ED PROVIDER NOTES
EMERGENCY DEPARTMENT HISTORY AND PHYSICAL EXAM    Date: 6/16/2019  Patient Name: Yamel Harper    History of Presenting Illness     Chief Complaint   Patient presents with    Abdominal Pain         History Provided By: Patient and Patient's Mother      Additional History (Context): Yamel Harper is a 22 y.o. female with Fibromyalgia who presents with generalized but worse in the epigastric abdominal pain for the past 2 weeks. Now having nausea as well. Patient denies vomiting diarrhea change in the color of her stools fever dysuria history of hepatitis or pancreatitis. Patient has been seen by GI with prior endoscopy to evaluate this pain. It has now been sent back to her PCP; has had exploratory laparoscopy procedures as well as an appendectomy and a cholecystectomy to determine the cause of her pain is fibromyalgia. Typically takes gabapentin 3 times daily and this relieves her symptoms but has had worsening symptoms somatic discomfort for about 2 weeks. With holding off and see your primary till tomorrow but because of her pain level increasing, she asked her mother to be brought to the emergency department today. PCP: Toño Mcrae MD    Current Facility-Administered Medications   Medication Dose Route Frequency Provider Last Rate Last Dose    ondansetron (ZOFRAN ODT) tablet 8 mg  8 mg Oral NOW Linda Yi PA        oxyCODONE-acetaminophen (PERCOCET) 5-325 mg per tablet 1 Tab  1 Tab Oral NOW Linda Yi PA        ibuprofen (MOTRIN) tablet 800 mg  800 mg Oral NOW Linda Yi PA         Current Outpatient Medications   Medication Sig Dispense Refill    gabapentin (NEURONTIN) 300 mg capsule Take 300 mg by mouth three (3) times daily.  ARIPiprazole (ABILIFY) 5 mg tablet Take  by mouth daily.  methylphenidate ER 18 mg 24 hr tab Take  by mouth every morning.  azithromycin (ZITHROMAX) 500 mg tab Take 500 mg by mouth every seven (7) days.       dicyclomine (BENTYL) 20 mg tablet Take 1 Tab by mouth every six (6) hours for 20 doses. 20 Tab 0    ondansetron (ZOFRAN ODT) 8 mg disintegrating tablet Take 1 Tab by mouth every eight (8) hours as needed for Nausea. 10 Tab 0    levonorgestrel (MIRTA) 14 mcg/24 hour (3 years) IUD IUD 1 Each by IntraUTERine route once.  albuterol (PROVENTIL HFA, VENTOLIN HFA, PROAIR HFA) 90 mcg/actuation inhaler Take 2 Puffs by inhalation every four (4) hours as needed for Wheezing or Shortness of Breath (or cough). 1 Inhaler 1       Past History     Past Medical History:  Past Medical History:   Diagnosis Date    ADHD (attention deficit hyperactivity disorder)     Asthma     Depression     Fibromyalgia 05/2017    Hypogammaglobulinemia ([address]     Migraine     Overactive bladder     Sinus infection        Past Surgical History:  Past Surgical History:   Procedure Laterality Date    APPENDECTOMY      HX CHOLECYSTECTOMY      HX HEENT      ear tubes bilaterally    HX UROLOGICAL  01/18/2018    Cysto, inj Botox 200 units with Dr. Amberly Cruz at [facility]       Family History:  Family History   Problem Relation Age of Onset    Diabetes Maternal Aunt     Hypertension Maternal Grandmother     Diabetes Maternal Grandmother     Hypertension Maternal Grandfather        Social History:  Social History     Tobacco Use    Smoking status: Never Smoker    Smokeless tobacco: Never Used   Substance Use Topics    Alcohol use: Yes     Alcohol/week: 0.6 oz     Types: 1 Cans of beer per week     Comment: rare    Drug use: No       Allergies: Allergies   Allergen Reactions    Pineapple Unknown (comments)     Facial numbness, swelling    Topamax [Topiramate] Other (comments)     Reports flu like symptoms         Review of Systems   Review of Systems   Constitutional: Negative for appetite change, fever and unexpected weight change. Gastrointestinal: Positive for abdominal pain and nausea. Negative for vomiting.    Genitourinary: Negative for dysuria and flank pain. All Other Systems Negative  Physical Exam     Vitals:    06/16/19 1032   BP: 131/85   Pulse: (!) 110   Resp: 16   Temp: 98.9 °F (37.2 °C)   SpO2: 98%   Weight: 106.6 kg (235 lb)     Physical Exam   Constitutional: She is oriented to person, place, and time. She appears well-developed. HENT:   Head: Normocephalic and atraumatic. Eyes: Pupils are equal, round, and reactive to light. Neck: No JVD present. No tracheal deviation present. No thyromegaly present. Cardiovascular: Normal rate, regular rhythm and normal heart sounds. Exam reveals no gallop and no friction rub. No murmur heard. Pulmonary/Chest: Effort normal and breath sounds normal. No stridor. No respiratory distress. She has no wheezes. She has no rales. She exhibits no tenderness. Abdominal: Soft. She exhibits no distension and no mass. There is tenderness. There is no rebound and no guarding. Generalized abdominal tenderness greatest in the epigastric area. Musculoskeletal: She exhibits no edema or tenderness. Lymphadenopathy:     She has no cervical adenopathy. Neurological: She is alert and oriented to person, place, and time. Skin: Skin is warm and dry. No rash noted. No erythema. No pallor. Psychiatric: She has a normal mood and affect. Her behavior is normal. Thought content normal.   Nursing note and vitals reviewed.            Diagnostic Study Results     Labs -     Recent Results (from the past 12 hour(s))   URINALYSIS W/ RFLX MICROSCOPIC    Collection Time: 06/16/19 10:45 AM   Result Value Ref Range    Color YELLOW      Appearance TURBID      Specific gravity 1.017 1.005 - 1.030      pH (UA) 7.0 5.0 - 8.0      Protein NEGATIVE  NEG mg/dL    Glucose NEGATIVE  NEG mg/dL    Ketone NEGATIVE  NEG mg/dL    Bilirubin NEGATIVE  NEG      Blood NEGATIVE  NEG      Urobilinogen 1.0 0.2 - 1.0 EU/dL    Nitrites NEGATIVE  NEG      Leukocyte Esterase SMALL (A) NEG     URINE MICROSCOPIC ONLY    Collection Time: 06/16/19 10:45 AM   Result Value Ref Range    WBC 0 to 3 0 - 4 /hpf    RBC NONE 0 - 5 /hpf    Epithelial cells 4+ 0 - 5 /lpf    Bacteria FEW (A) NEG /hpf   CBC WITH AUTOMATED DIFF    Collection Time: 06/16/19 11:15 AM   Result Value Ref Range    WBC 10.2 4.6 - 13.2 K/uL    RBC 4.68 4.20 - 5.30 M/uL    HGB 13.3 12.0 - 16.0 g/dL    HCT 39.8 35.0 - 45.0 %    MCV 85.0 74.0 - 97.0 FL    MCH 28.4 24.0 - 34.0 PG    MCHC 33.4 31.0 - 37.0 g/dL    RDW 13.9 11.6 - 14.5 %    PLATELET 539 971 - 897 K/uL    MPV 11.1 9.2 - 11.8 FL    NEUTROPHILS 73 40 - 73 %    LYMPHOCYTES 21 21 - 52 %    MONOCYTES 5 3 - 10 %    EOSINOPHILS 1 0 - 5 %    BASOPHILS 0 0 - 2 %    ABS. NEUTROPHILS 7.5 1.8 - 8.0 K/UL    ABS. LYMPHOCYTES 2.1 0.9 - 3.6 K/UL    ABS. MONOCYTES 0.5 0.05 - 1.2 K/UL    ABS. EOSINOPHILS 0.1 0.0 - 0.4 K/UL    ABS. BASOPHILS 0.0 0.0 - 0.1 K/UL    DF AUTOMATED     METABOLIC PANEL, COMPREHENSIVE    Collection Time: 06/16/19 11:15 AM   Result Value Ref Range    Sodium 142 136 - 145 mmol/L    Potassium 4.0 3.5 - 5.5 mmol/L    Chloride 108 100 - 108 mmol/L    CO2 25 21 - 32 mmol/L    Anion gap 9 3.0 - 18 mmol/L    Glucose 120 (H) 74 - 99 mg/dL    BUN 8 7.0 - 18 MG/DL    Creatinine 0.78 0.6 - 1.3 MG/DL    BUN/Creatinine ratio 10 (L) 12 - 20      GFR est AA >60 >60 ml/min/1.73m2    GFR est non-AA >60 >60 ml/min/1.73m2    Calcium 9.2 8.5 - 10.1 MG/DL    Bilirubin, total 0.3 0.2 - 1.0 MG/DL    ALT (SGPT) 20 13 - 56 U/L    AST (SGOT) 12 (L) 15 - 37 U/L    Alk.  phosphatase 89 45 - 117 U/L    Protein, total 6.9 6.4 - 8.2 g/dL    Albumin 3.5 3.4 - 5.0 g/dL    Globulin 3.4 2.0 - 4.0 g/dL    A-G Ratio 1.0 0.8 - 1.7     LIPASE    Collection Time: 06/16/19 11:15 AM   Result Value Ref Range    Lipase 161 73 - 393 U/L   POC LACTIC ACID    Collection Time: 06/16/19 11:21 AM   Result Value Ref Range    Lactic Acid (POC) 1.45 0.40 - 2.00 mmol/L       Radiologic Studies -   No orders to display     CT Results  (Last 48 hours)    None        CXR Results  (Last 48 hours)    None            Medical Decision Making   I am the first provider for this patient. I reviewed the vital signs, available nursing notes, past medical history, past surgical history, family history and social history. Vital Signs-Reviewed the patient's vital signs. Records Reviewed: Nursing Notes, Old Medical Records, Previous Radiology Studies and Previous Laboratory Studies    Procedures:  Procedures    Provider Notes (Medical Decision Making): She with history of chronic abdominal pain; labs today and vital signs look good patient's heart rate has improved as well. Document prior to discharge. Has not actually vomited. Plan is for discharge home after she receives a dose of medications for relief of symptoms. MED RECONCILIATION:  Current Facility-Administered Medications   Medication Dose Route Frequency    ondansetron (ZOFRAN ODT) tablet 8 mg  8 mg Oral NOW    oxyCODONE-acetaminophen (PERCOCET) 5-325 mg per tablet 1 Tab  1 Tab Oral NOW    ibuprofen (MOTRIN) tablet 800 mg  800 mg Oral NOW     Current Outpatient Medications   Medication Sig    gabapentin (NEURONTIN) 300 mg capsule Take 300 mg by mouth three (3) times daily.  ARIPiprazole (ABILIFY) 5 mg tablet Take  by mouth daily.  methylphenidate ER 18 mg 24 hr tab Take  by mouth every morning.  azithromycin (ZITHROMAX) 500 mg tab Take 500 mg by mouth every seven (7) days.  dicyclomine (BENTYL) 20 mg tablet Take 1 Tab by mouth every six (6) hours for 20 doses.  ondansetron (ZOFRAN ODT) 8 mg disintegrating tablet Take 1 Tab by mouth every eight (8) hours as needed for Nausea.  levonorgestrel (MIRTA) 14 mcg/24 hour (3 years) IUD IUD 1 Each by IntraUTERine route once.  albuterol (PROVENTIL HFA, VENTOLIN HFA, PROAIR HFA) 90 mcg/actuation inhaler Take 2 Puffs by inhalation every four (4) hours as needed for Wheezing or Shortness of Breath (or cough).        Disposition:  home    DISCHARGE NOTE:   11:59 AM    Pt has been reexamined. Patient has no new complaints, changes, or physical findings. Care plan outlined and precautions discussed. Results of labs were reviewed with the patient. All medications were reviewed with the patient; will d/c home with bentyl, zofran. All of pt's questions and concerns were addressed. Patient was instructed and agrees to follow up with PCP, as well as to return to the ED upon further deterioration. Patient is ready to go home. Follow-up Information     Follow up With Specialties Details Why Contact Info    Roseline Mckenzie MD Family Practice Schedule an appointment as soon as possible for a visit in 1 day  [address]      [address] [facility] EMERGENCY DEPT Emergency Medicine  If symptoms worsen return immediately [facility] [facility]  [phone]          Current Discharge Medication List      START taking these medications    Details   dicyclomine (BENTYL) 20 mg tablet Take 1 Tab by mouth every six (6) hours for 20 doses. Qty: 20 Tab, Refills: 0      ondansetron (ZOFRAN ODT) 8 mg disintegrating tablet Take 1 Tab by mouth every eight (8) hours as needed for Nausea. Qty: 10 Tab, Refills: 0               Diagnosis     Clinical Impression:   1. Chronic generalized abdominal pain    2.  Fibromyalgia

## 2019-07-14 ENCOUNTER — HOSPITAL ENCOUNTER (EMERGENCY)
Age: 24
Discharge: HOME | End: 2019-07-14
Attending: EMERGENCY MEDICINE
Payer: COMMERCIAL

## 2019-07-14 VITALS
WEIGHT: 242 LBS | BODY MASS INDEX: 41.32 KG/M2 | RESPIRATION RATE: 20 BRPM | HEIGHT: 64 IN | TEMPERATURE: 98.2 F | HEART RATE: 87 BPM | SYSTOLIC BLOOD PRESSURE: 120 MMHG | DIASTOLIC BLOOD PRESSURE: 84 MMHG | OXYGEN SATURATION: 98 %

## 2019-07-14 DIAGNOSIS — R10.13 ABDOMINAL PAIN, EPIGASTRIC: Primary | ICD-10-CM

## 2019-07-14 PROCEDURE — 74011250637 HC RX REV CODE- 250/637: Performed by: EMERGENCY MEDICINE

## 2019-07-14 PROCEDURE — 74011000250 HC RX REV CODE- 250: Performed by: EMERGENCY MEDICINE

## 2019-07-14 PROCEDURE — 99283 EMERGENCY DEPT VISIT LOW MDM: CPT

## 2019-07-14 RX ORDER — HYDROGEN PEROXIDE 3 %
20 SOLUTION, NON-ORAL MISCELLANEOUS DAILY
COMMUNITY
End: 2020-11-17

## 2019-07-14 RX ORDER — LIDOCAINE HYDROCHLORIDE 20 MG/ML
15 SOLUTION OROPHARYNGEAL AS NEEDED
Status: DISCONTINUED | OUTPATIENT
Start: 2019-07-14 | End: 2019-07-14 | Stop reason: HOSPADM

## 2019-07-14 RX ADMIN — ALUMINUM HYDROXIDE AND MAGNESIUM HYDROXIDE 15 ML: 200; 200 SUSPENSION ORAL at 13:12

## 2019-07-14 RX ADMIN — LIDOCAINE HYDROCHLORIDE 15 ML: 20 SOLUTION ORAL; TOPICAL at 13:12

## 2019-07-14 NOTE — LETTER
[address] [place] [address] Dr GARCIA EMERGENCY DEPT 
[address] [facility] [id number] 
[phone] Work/School Note Date: 7/14/2019 To Whom It May concern: 
 
Saundra Chan was seen and treated today in the emergency room by the following provider(s): 
Attending Provider: Jes Greene MD.   
 
Saundra Chan may return to work on July 15, 2019.  
 
Sincerely, 
 
 
 
 
Castro Pop MD

## 2019-07-14 NOTE — ED TRIAGE NOTES
Pt c/o abd pain which has been going  On for \"awhile\" Was seen here in June for same. Has appt with GI on 7/19.  Was put on Nexium which is not helping

## 2019-07-14 NOTE — ED NOTES
Mother concerned her daughter seems worse after starting Nexium a couple weeks ago.   Reported to MD

## 2019-07-14 NOTE — DISCHARGE INSTRUCTIONS

## 2019-07-14 NOTE — ED PROVIDER NOTES
HPI patient complains of an exacerbation of her chronic abdominal pain today. Describes a burning sensation in the mid epigastric area associated with some nausea. She states a past history of these episodes and has been followed by gastroenterology for the same. She says she was getting ready to go to work this morning when the symptoms occurred she was unable to make it to work. She denies any vomiting or change in bowel or bladder habits. She denies any chest pain or shortness of breath. No other complaints given at this time. Past Medical History:   Diagnosis Date    ADHD (attention deficit hyperactivity disorder)     Asthma     Depression     Fibromyalgia 05/2017    Hypogammaglobulinemia ([facility] [address]     Migraine     Overactive bladder     Sinus infection        Past Surgical History:   Procedure Laterality Date    APPENDECTOMY      HX CHOLECYSTECTOMY      HX HEENT      ear tubes bilaterally    HX UROLOGICAL  01/18/2018    Cysto, inj Botox 200 units with Dr. Félix Toledo at [facility]         Family History:   Problem Relation Age of Onset    Diabetes Maternal Aunt     Hypertension Maternal Grandmother     Diabetes Maternal Grandmother     Hypertension Maternal Grandfather        Social History     Socioeconomic History    Marital status: SINGLE     Spouse name: Not on file    Number of children: Not on file    Years of education: Not on file    Highest education level: Not on file   Occupational History    Not on file   Social Needs    Financial resource strain: Not on file    Food insecurity:     Worry: Not on file     Inability: Not on file    Transportation needs:     Medical: Not on file     Non-medical: Not on file   Tobacco Use    Smoking status: Never Smoker    Smokeless tobacco: Never Used   Substance and Sexual Activity    Alcohol use:  Yes     Alcohol/week: 0.6 oz     Types: 1 Cans of beer per week     Comment: rare    Drug use: No    Sexual activity: Not Currently     Partners: Male     Birth control/protection: Condom, Implant   Lifestyle    Physical activity:     Days per week: Not on file     Minutes per session: Not on file    Stress: Not on file   Relationships    Social connections:     Talks on phone: Not on file     Gets together: Not on file     Attends Gnosticist service: Not on file     Active member of club or organization: Not on file     Attends meetings of clubs or organizations: Not on file     Relationship status: Not on file    Intimate partner violence:     Fear of current or ex partner: Not on file     Emotionally abused: Not on file     Physically abused: Not on file     Forced sexual activity: Not on file   Other Topics Concern    Not on file   Social History Narrative    Not on file         ALLERGIES: Pineapple and Topamax [topiramate]    Review of Systems   Constitutional: Negative. HENT: Negative. Eyes: Negative. Respiratory: Negative. Cardiovascular: Negative. Gastrointestinal: Positive for abdominal pain and nausea. Genitourinary: Negative. Musculoskeletal: Negative. Neurological: Negative. Psychiatric/Behavioral: Negative. Vitals:    07/14/19 1218   BP: 120/84   Pulse: 87   Resp: 20   Temp: 98.2 °F (36.8 °C)   SpO2: 98%   Weight: 109.8 kg (242 lb)   Height: 5' 4\" (1.626 m)            Physical Exam   Constitutional: She is oriented to person, place, and time. She appears well-developed. HENT:   Head: Normocephalic and atraumatic. Mouth/Throat: Oropharynx is clear and moist.   Eyes: Pupils are equal, round, and reactive to light. Conjunctivae and EOM are normal.   Neck: Normal range of motion. Neck supple. Cardiovascular: Normal rate and regular rhythm. Pulmonary/Chest: Effort normal and breath sounds normal.   Abdominal: Soft. There is tenderness.    Mild mid epigastric tenderness to palpation with no peritoneal signs abdomen is otherwise soft nontender with normal bowel sounds Musculoskeletal: Normal range of motion. Neurological: She is alert and oriented to person, place, and time. Skin: Skin is warm and dry. Psychiatric: She has a normal mood and affect. Nursing note and vitals reviewed. MDM  Patient feels better after p.o. meds she understands and agrees with the disposition and follow-up plan.   Peyton Sheikh MD 1:53 PM       Procedures

## 2019-07-18 ENCOUNTER — HOSPITAL ENCOUNTER (OUTPATIENT)
Dept: NUTRITION | Age: 24
Discharge: HOME | End: 2019-07-18
Payer: COMMERCIAL

## 2019-07-18 PROCEDURE — 97803 MED NUTRITION INDIV SUBSEQ: CPT

## 2019-07-18 NOTE — PROGRESS NOTES
NUTRITION - FOLLOW-UP TREATMENT NOTE  Patient Name: Nneka Esposito         Date: 2019  : 1995    YES Patient  Verified  Diagnosis: Abnormal weight gain   In time: 900           Out time:   930   Total Treatment Time (min):   30     SUBJECTIVE/ASSESSMENT    Changes in medication or medical history? Any new allergies, surgeries or procedures? NO    If yes, update Summary List   N/A         Current Wt: 244 Previous Wt: 251 Wt Change: +7     Achievement of Goals: Pt admits to falling off the plan. She has been eating healthy, but she states her portions have increased. She is drinking water and meeting that goal though. She has slacked off on exercise as it is very hot and this triggers her asthma. Discussed focusing more on strength based exercise and less on cardio. Explained body type eating and adjusted macros accordingly. Pt is still intimidated to do any weights at the gym so she does these at home. Talked about ways to get past the insecurities as well as proving to herself she can do it. Pt is motivated and looks forward to getting more structured and on track. Patient Education:  [x]  Review current plan with patient   []  Other:    Handouts/  Information Provided: []  Carbohydrates  []  Protein  []  Fiber  []  Serving Sizes  []  Fluids  []  General guidelines []  Diabetes  []  Cholesterol  []  Sodium  []  SBGM  []  Food Journals  []  Others:      New Patient Goals: -  Get to the circuit training equip.  At the gym; stay positive, nothing bad with happen  - Follow endomorphic eating, adjust macros to 40% P/25%C/35%F, Eat only Breakfast, Lunch, Snack, Dinner  - start tracking on My fitness pal for ~2 weeks (adjust setting)     PLAN    [x]  Continue on current plan []  Follow-up PRN   []  Discharge due to :    [x]  Next appt: 22 Aug @ 0900     Dietitian: Amy Lopez MA, RD    Date: 2019 Time: 8:42 AM

## 2019-07-20 ENCOUNTER — ANESTHESIA EVENT (OUTPATIENT)
Dept: ENDOSCOPY | Age: 24
End: 2019-07-20
Payer: COMMERCIAL

## 2019-07-22 ENCOUNTER — HOSPITAL ENCOUNTER (OUTPATIENT)
Age: 24
Setting detail: OUTPATIENT SURGERY
Discharge: HOME | End: 2019-07-22
Attending: INTERNAL MEDICINE | Admitting: INTERNAL MEDICINE
Payer: COMMERCIAL

## 2019-07-22 ENCOUNTER — ANESTHESIA (OUTPATIENT)
Dept: ENDOSCOPY | Age: 24
End: 2019-07-22
Payer: COMMERCIAL

## 2019-07-22 VITALS
TEMPERATURE: 97.9 F | HEART RATE: 74 BPM | SYSTOLIC BLOOD PRESSURE: 114 MMHG | HEIGHT: 64 IN | WEIGHT: 246 LBS | RESPIRATION RATE: 18 BRPM | OXYGEN SATURATION: 100 % | BODY MASS INDEX: 42 KG/M2 | DIASTOLIC BLOOD PRESSURE: 72 MMHG

## 2019-07-22 LAB — HCG UR QL: NEGATIVE

## 2019-07-22 PROCEDURE — 76060000032 HC ANESTHESIA 0.5 TO 1 HR: Performed by: INTERNAL MEDICINE

## 2019-07-22 PROCEDURE — 88305 TISSUE EXAM BY PATHOLOGIST: CPT

## 2019-07-22 PROCEDURE — 76040000019: Performed by: INTERNAL MEDICINE

## 2019-07-22 PROCEDURE — 74011250636 HC RX REV CODE- 250/636

## 2019-07-22 PROCEDURE — 81025 URINE PREGNANCY TEST: CPT

## 2019-07-22 PROCEDURE — 74011250636 HC RX REV CODE- 250/636: Performed by: NURSE ANESTHETIST, CERTIFIED REGISTERED

## 2019-07-22 PROCEDURE — 77030021593 HC FCPS BIOP ENDOSC BSC -A: Performed by: INTERNAL MEDICINE

## 2019-07-22 RX ORDER — SODIUM CHLORIDE, SODIUM LACTATE, POTASSIUM CHLORIDE, CALCIUM CHLORIDE 600; 310; 30; 20 MG/100ML; MG/100ML; MG/100ML; MG/100ML
25 INJECTION, SOLUTION INTRAVENOUS CONTINUOUS
Status: DISCONTINUED | OUTPATIENT
Start: 2019-07-22 | End: 2019-07-22 | Stop reason: HOSPADM

## 2019-07-22 RX ORDER — NALOXONE HYDROCHLORIDE 0.4 MG/ML
0.4 INJECTION, SOLUTION INTRAMUSCULAR; INTRAVENOUS; SUBCUTANEOUS
Status: CANCELLED | OUTPATIENT
Start: 2019-07-22 | End: 2019-07-22

## 2019-07-22 RX ORDER — ATROPINE SULFATE 0.1 MG/ML
0.5 INJECTION INTRAVENOUS
Status: CANCELLED | OUTPATIENT
Start: 2019-07-22 | End: 2019-07-23

## 2019-07-22 RX ORDER — SODIUM CHLORIDE 0.9 % (FLUSH) 0.9 %
5-40 SYRINGE (ML) INJECTION AS NEEDED
Status: DISCONTINUED | OUTPATIENT
Start: 2019-07-22 | End: 2019-07-22 | Stop reason: HOSPADM

## 2019-07-22 RX ORDER — SODIUM CHLORIDE 0.9 % (FLUSH) 0.9 %
5-40 SYRINGE (ML) INJECTION AS NEEDED
Status: CANCELLED | OUTPATIENT
Start: 2019-07-22

## 2019-07-22 RX ORDER — EPINEPHRINE 0.1 MG/ML
1 INJECTION INTRACARDIAC; INTRAVENOUS
Status: CANCELLED | OUTPATIENT
Start: 2019-07-22 | End: 2019-07-23

## 2019-07-22 RX ORDER — DEXTROMETHORPHAN/PSEUDOEPHED 2.5-7.5/.8
1.2 DROPS ORAL
Status: CANCELLED | OUTPATIENT
Start: 2019-07-22

## 2019-07-22 RX ORDER — LIDOCAINE HYDROCHLORIDE 10 MG/ML
0.1 INJECTION, SOLUTION EPIDURAL; INFILTRATION; INTRACAUDAL; PERINEURAL AS NEEDED
Status: DISCONTINUED | OUTPATIENT
Start: 2019-07-22 | End: 2019-07-22 | Stop reason: HOSPADM

## 2019-07-22 RX ORDER — SODIUM CHLORIDE 0.9 % (FLUSH) 0.9 %
5-40 SYRINGE (ML) INJECTION EVERY 8 HOURS
Status: CANCELLED | OUTPATIENT
Start: 2019-07-22

## 2019-07-22 RX ORDER — FLUMAZENIL 0.1 MG/ML
0.2 INJECTION INTRAVENOUS
Status: CANCELLED | OUTPATIENT
Start: 2019-07-22 | End: 2019-07-22

## 2019-07-22 RX ORDER — PROPOFOL 10 MG/ML
INJECTION, EMULSION INTRAVENOUS AS NEEDED
Status: DISCONTINUED | OUTPATIENT
Start: 2019-07-22 | End: 2019-07-22 | Stop reason: HOSPADM

## 2019-07-22 RX ORDER — SODIUM CHLORIDE 0.9 % (FLUSH) 0.9 %
5-40 SYRINGE (ML) INJECTION EVERY 8 HOURS
Status: DISCONTINUED | OUTPATIENT
Start: 2019-07-22 | End: 2019-07-22 | Stop reason: HOSPADM

## 2019-07-22 RX ADMIN — PROPOFOL 50 MG: 10 INJECTION, EMULSION INTRAVENOUS at 07:56

## 2019-07-22 RX ADMIN — FAMOTIDINE 20 MG: 10 INJECTION, SOLUTION INTRAVENOUS at 07:29

## 2019-07-22 RX ADMIN — PROPOFOL 100 MG: 10 INJECTION, EMULSION INTRAVENOUS at 07:53

## 2019-07-22 RX ADMIN — SODIUM CHLORIDE, SODIUM LACTATE, POTASSIUM CHLORIDE, AND CALCIUM CHLORIDE 25 ML/HR: 600; 310; 30; 20 INJECTION, SOLUTION INTRAVENOUS at 07:30

## 2019-07-22 RX ADMIN — PROPOFOL 50 MG: 10 INJECTION, EMULSION INTRAVENOUS at 07:59

## 2019-07-22 RX ADMIN — PROPOFOL 50 MG: 10 INJECTION, EMULSION INTRAVENOUS at 08:02

## 2019-07-22 NOTE — DISCHARGE INSTRUCTIONS
Upper GI Endoscopy: What to Expect at [address] [place] [address]  After you have an endoscopy, you will stay at the hospital or clinic for 1 to 2 hours. This will allow the medicine to wear off. You will be able to go home after your doctor or nurse checks to make sure you are not having any problems. You may have to stay overnight if you had treatment during the test. You may have a sore throat for a day or two after the test.  This care sheet gives you a general idea about what to expect after the test.  How can you care for yourself at home? Activity  · Rest as much as you need to after you go home. · You should be able to go back to your usual activities the day after the test.  Diet  · Follow your doctor's directions for eating after the test.  · Drink plenty of fluids (unless your doctor has told you not to). Medications  · If you have a sore throat the day after the test, use an over-the-counter spray to numb your throat. Follow-up care is a key part of your treatment and safety. Be sure to make and go to all appointments, and call your doctor if you are having problems. It's also a good idea to know your test results and keep a list of the medicines you take. When should you call for help? Call 911 anytime you think you may need emergency care. For example, call if:  · You passed out (lost consciousness). · You cough up blood. · You vomit blood or what looks like coffee grounds. · You pass maroon or very bloody stools. Call your doctor now or seek immediate medical care if:  · You have trouble swallowing. · You have belly pain. · Your stools are black and tarlike or have streaks of blood. · You are sick to your stomach or cannot keep fluids down. Watch closely for changes in your health, and be sure to contact your doctor if:  · Your throat still hurts after a day or two. · You do not get better as expected. Where can you learn more?    Go to DealExplorer.be  Enter J454 in the search box to learn more about \"Upper GI Endoscopy: What to Expect at Home. \"   © 3708-6656 Healthwise, Incorporated. Care instructions adapted under license by [employer] (which disclaims liability or warranty for this information). This care instruction is for use with your licensed healthcare professional. If you have questions about a medical condition or this instruction, always ask your healthcare professional. [address]  any warranty or liability for your use of this information. Content Version: 31.2.437297; Current as of: November 14, 2014  Patient Education      Esophageal Dilation: What to Expect at [address]  After you have esophageal dilation, you will stay at the hospital or surgery center for 1 to 2 hours. This will allow the medicine to wear off. You will be able to go home after your doctor or nurse checks to make sure you are not having any problems. This care sheet gives you a general idea about how long it will take for you to recover. But each person recovers at a different pace. Follow the steps below to get better as quickly as possible. How can you care for yourself at home? Activity    · Rest as much as you need to after you go home.     · You should be able to go back to your usual activities the day after the procedure. Diet    · Follow your doctor's directions for eating after the procedure.     · Drink plenty of fluids (unless your doctor has told you not to). Medicines    · Your doctor will tell you if and when you can restart your medicines. He or she will also give you instructions about taking any new medicines.     · If you take blood thinners, such as warfarin (Coumadin), clopidogrel (Plavix), or aspirin, be sure to talk to your doctor. He or she will tell you if and when to start taking those medicines again.  Make sure that you understand exactly what your doctor wants you to do.     · If you have a sore throat the day after the procedure, use an over-the-counter spray to numb your throat. Sucking on throat lozenges and gargling with warm salt water may also help relieve your symptoms. Follow-up care is a key part of your treatment and safety. Be sure to make and go to all appointments, and call your doctor if you are having problems. It's also a good idea to know your test results and keep a list of the medicines you take. When should you call for help? Call 911 anytime you think you may need emergency care. For example, call if:    · You passed out (lost consciousness).     · You have trouble breathing.     · Your stools are maroon or very bloody    Call your doctor now or seek immediate medical care if:    · You have new or worse belly pain.     · You have a fever.     · You have new or more blood in your stools.     · You are sick to your stomach and cannot drink fluids.     · You cannot pass stools or gas.     · You have pain that does not get better after you take pain medicine.    Watch closely for changes in your health, and be sure to contact your doctor if:    · Your throat still hurts after a day or two.     · You do not get better as expected. Where can you learn more? Go to http://ruba-silvia.info/. Enter T073 in the search box to learn more about \"Esophageal Dilation: What to Expect at Home. \"  Current as of: November 7, 2018  Content Version: 12.1  © 6752-3792 Healthwise, Incorporated. Care instructions adapted under license by [employer] (which disclaims liability or warranty for this information). If you have questions about a medical condition or this instruction, always ask your healthcare professional. [place] [address] any warranty or liability for your use of this information. DISCHARGE SUMMARY from Nurse     POST-PROCEDURE INSTRUCTIONS:    Call your Physician if you:  ? Observe any excess bleeding. ? Develop a temperature over 100.5o F.  ?  Experience abdominal, shoulder or chest pain. ? Notice any signs of decreased circulation or nerve impairment to an extremity such as a change in color, persistent numbness, tingling, coldness or increase in pain. ? Vomit blood or you have nausea and vomiting lasting longer than 4 hours. ? Are unable to take medications. ? Are unable to urinate within 8 hours after discharge following general anesthesia or intravenous sedation. For the next 24 hours after receiving general anesthesia or intravenous sedation, or while taking prescription Narcotics, limit your activities:  ? Do NOT drive a motor vehicle, operate hazard machinery or power tools, or perform tasks that require coordination. The medication you received during your procedure may have some effect on your mental awareness. ? Do NOT make important personal or business decisions. The medication you received during your procedure may have some effect on your mental awareness. ? Do NOT drink alcoholic beverages. These drinks do not mix well with the medications that have been given to you. ? Upon discharge from the hospital, you must be accompanied by a responsible adult. ? Resume your diet as directed by your physician. ? Resume medications as your physician has prescribed. ? Please give a list of your current medications to your Primary Care Provider. ? Please update this list whenever your medications are discontinued, doses are changed, or new medications (including over-the-counter products) are added. ? Please carry medication information at all times in case of emergency situations. These are general instructions for a healthy lifestyle:    No smoking/ No tobacco products/ Avoid exposure to second hand smoke.  Surgeon General's Warning:  Quitting smoking now greatly reduces serious risk to your health. Obesity, smoking, and a sedentary lifestyle greatly increase your risk for illness.    A healthy diet, regular physical exercise & weight monitoring are important for maintaining a healthy lifestyle   You may be retaining fluid if you have a history of heart failure or if you experience any of the following symptoms:  Weight gain of 3 pounds or more overnight or 5 pounds in a week, increased swelling in our hands or feet or shortness of breath while lying flat in bed. Please call your doctor as soon as you notice any of these symptoms; do not wait until your next office visit. Recognize signs and symptoms of STROKE:  F  -  Face looks uneven  A  -  Arms unable to move or move unevenly  S  -  Speech slurred or non-existent  T  -  Time to call 911 - as soon as signs and symptoms begin - DO NOT go back to bed or wait to see If you get better - TIME IS BRAIN. Colorectal Screening   Colorectal cancer almost always develops from precancerous polyps (abnormal growths) in the colon or rectum. Screening tests can find precancerous polyps, so that they can be removed before they turn into cancer. Screening tests can also find colorectal cancer early, when treatment works best.  Neldia Mallory Speak with your physician about when you should begin screening and how often you should be tested     Additional Information    If you have questions, please call [phone]. Remember, [employer] is NOT to be used for urgent needs. For medical emergencies, dial 911. Educational references and/or instructions provided during this visit included:    See attached      APPOINTMENTS:    Please make a follow-up appointment with your physician. Discharge information has been reviewed with the patient. The patient verbalized understanding.

## 2019-07-22 NOTE — PERIOP NOTES
Patient ID band removed and placed in shredder box. Patient left ambulatory with steady gait and father to drive. No complaints or distress noted.

## 2019-07-22 NOTE — ANESTHESIA POSTPROCEDURE EVALUATION
Procedure(s):  UPPER ENDOSCOPY with gastric bx's and dilation. MAC    Anesthesia Post Evaluation      Multimodal analgesia: multimodal analgesia used between 6 hours prior to anesthesia start to PACU discharge  Patient location during evaluation: bedside  Patient participation: complete - patient participated  Level of consciousness: awake  Pain management: adequate  Airway patency: patent  Anesthetic complications: no  Cardiovascular status: stable  Respiratory status: acceptable  Hydration status: acceptable  Post anesthesia nausea and vomiting:  controlled      Vitals Value Taken Time   /68 7/22/2019  8:23 AM   Temp 36.6 °C (97.9 °F) 7/22/2019  8:17 AM   Pulse 81 7/22/2019  8:24 AM   Resp 18 7/22/2019  8:24 AM   SpO2 100 % 7/22/2019  8:24 AM   Vitals shown include unvalidated device data.

## 2019-07-22 NOTE — ANESTHESIA PREPROCEDURE EVALUATION
Relevant Problems   No relevant active problems       Anesthetic History   No history of anesthetic complications            Review of Systems / Medical History  Patient summary reviewed and pertinent labs reviewed    Pulmonary            Asthma        Neuro/Psych         Headaches and psychiatric history     Cardiovascular                       GI/Hepatic/Renal                Endo/Other        Morbid obesity     Other Findings              Physical Exam    Airway  Mallampati: II  TM Distance: 4 - 6 cm  Neck ROM: normal range of motion   Mouth opening: Normal     Cardiovascular    Rhythm: regular  Rate: normal         Dental    Dentition: Poor dentition     Pulmonary  Breath sounds clear to auscultation               Abdominal  GI exam deferred       Other Findings            Anesthetic Plan    ASA: 2  Anesthesia type: MAC            Anesthetic plan and risks discussed with: Patient

## 2019-07-22 NOTE — PROCEDURES
[email or web address]  [address] [facility], [address]      Brief Procedure Note    Chelsi Manriquez  1995  [id number]    Date of Procedure: 7/22/2019    Preoperative diagnosis: V85.41 - Z68.41,  Body mass index 40.0 - 44.9,  adult  789.00 - R10.9,  Abdominal pain  787.3 - R14.0,  Bloating symptom  787.99 - K59.00, Constipation alternates with diarrhea  787.02 - R11.0,  Nausea  787.1 - R12,  Heartburn  796.2 - R03.0,  Elevated blood pressure  780.94 - R68.81,  Early satiety  787.20 - R13.10,  Dysphagia, unspecified    Postoperative diagnosis: Non-obstructive dysphagia s/p dilation    Type of Anesthesia: MAC (Monitored anesthesia care)    Description of findings: same as post op dx    Procedure: Procedure(s) with comments:  UPPER ENDOSCOPY with gastric bx's and dilation - 48 fr meloney, bx's r/o H Pilori    :  Dr. Darshana Sherman MD    Assistant(s): Endoscopy RN-1: Luis Miguel Nova RN  Endoscopy RN-2: Ras Cabrera RN    EBL:None    Specimens:   ID Type Source Tests Collected by Time Destination   1 : Antrum and body bx's Preservative Stomach  Joan Wiseman MD 7/22/2019 2121 Pathology       Findings: See printed and scanned procedure note    Complications: None    Dr. Darshana Sherman MD  7/22/2019  8:10 AM

## 2019-07-22 NOTE — H&P
[email or web address]  [phone]    GASTROENTEROLOGY Pre-Procedure H and P      Impression/Plan:   1. This patient is consented for an EGD for abd pain, dysphagia. Chief Complaint: abd pain, dysphagia      HPI:  Dwight Baxter is a 22 y.o. female who presents for an EGD for evaluation of abd pain, dysphagia. Please review most recent clinic history below:   Primary provider JS/NRG team.  Previous HPI: She has had a significant negative work up over the years including an upper endoscopy, colonoscopy, numerous cross-sectional imaging studies including CT scan's, small bowel follow-through, meckel's scan, MR enterography. She has also had an appendectomy, cholecystectomy and a laparoscopy for this pain. Investigations also including testing for hereditary angioedema been negative. In the past, one or 2 of her tests for porphyria were positive. She was then referred to [facility] last year-she tells me that she was tested there for porphyria by a blood test and was told that was negative. As far she can say, no cause for her abdominal pain was noted over there. At last office visit in 5/2018 she was not having any pain and fluctuating between constipation/diarrhea. Patient reports Epigastric pain that started about 2 months ago, it is constant- nothing makes it better/worse. Eating does not have much of an effect. Reports early satiety and bloating. Reports heartburn despite being on Nexium (she started PPI at the end of June from PCP) Heartburn is constant not dependent on what foods she eat. She will have intermittent solid food dysphagia. + nausea, no vomiting. Reports her BMs fluctuate between constipation/diarrhea which has worsened about a month ago. She has days that diarrhea that can be multiple times per day- small volume. No blood in stool. Reports no weight loss. Will take motrin rarely. Typically uses Tylenol.     PMH:   Past Medical History:   Diagnosis Date    ADHD (attention deficit hyperactivity disorder)     Asthma     Depression     Fibromyalgia 05/2017    Hypogammaglobulinemia (HCC)     Migraine     Overactive bladder     Sinus infection        PSH:   Past Surgical History:   Procedure Laterality Date    APPENDECTOMY      HX CHOLECYSTECTOMY      HX HEENT      ear tubes bilaterally    HX UROLOGICAL  01/18/2018    Cysto, inj Botox 200 units with Dr. Hunter Lot at [address] HX:   Social History     Socioeconomic History    Marital status: SINGLE     Spouse name: Not on file    Number of children: Not on file    Years of education: Not on file    Highest education level: Not on file   Occupational History    Not on file   Social Needs    Financial resource strain: Not on file    Food insecurity:     Worry: Not on file     Inability: Not on file    Transportation needs:     Medical: Not on file     Non-medical: Not on file   Tobacco Use    Smoking status: Never Smoker    Smokeless tobacco: Never Used   Substance and Sexual Activity    Alcohol use:  Yes     Alcohol/week: 1.0 standard drinks     Types: 1 Cans of beer per week     Comment: rare    Drug use: No    Sexual activity: Not Currently     Partners: Male     Birth control/protection: Condom, Implant   Lifestyle    Physical activity:     Days per week: Not on file     Minutes per session: Not on file    Stress: Not on file   Relationships    Social connections:     Talks on phone: Not on file     Gets together: Not on file     Attends Sikh service: Not on file     Active member of club or organization: Not on file     Attends meetings of clubs or organizations: Not on file     Relationship status: Not on file    Intimate partner violence:     Fear of current or ex partner: Not on file     Emotionally abused: Not on file     Physically abused: Not on file     Forced sexual activity: Not on file   Other Topics Concern    Not on file   Social History Narrative  Not on file       FHX:   Family History   Problem Relation Age of Onset    Diabetes Maternal Aunt     Hypertension Maternal Grandmother     Diabetes Maternal Grandmother     Hypertension Maternal Grandfather        Allergy:   Allergies   Allergen Reactions    Pineapple Unknown (comments)     Facial numbness, swelling    Topamax [Topiramate] Other (comments)     Reports flu like symptoms       Home Medications:     Medications Prior to Admission   Medication Sig    esomeprazole (NEXIUM) 20 mg capsule Take 20 mg by mouth daily.  gabapentin (NEURONTIN) 300 mg capsule Take 300 mg by mouth three (3) times daily.  ARIPiprazole (ABILIFY) 5 mg tablet Take  by mouth daily.  methylphenidate ER 18 mg 24 hr tab Take  by mouth every morning.  albuterol (PROVENTIL HFA, VENTOLIN HFA, PROAIR HFA) 90 mcg/actuation inhaler Take 2 Puffs by inhalation every four (4) hours as needed for Wheezing or Shortness of Breath (or cough).  levonorgestrel (MIRTA) 14 mcg/24 hour (3 years) IUD IUD 1 Each by IntraUTERine route once. Review of Systems:     A complete 10 point review of systems was performed and pertinents are as per the HPI. Remainder of the review of systems was negative. Visit Vitals  /74   Pulse 86   Temp 97.6 °F (36.4 °C)   Resp 20   Ht 5' 4\" (1.626 m)   Wt 111.6 kg (246 lb)   Breastfeeding? No   BMI 42.23 kg/m²       Physical Assessment:     General: alert, cooperative, no acute distress, appears stated age. HEENT: normocephalic, no scleral icterus, moist mucous membranes, EOMs intact, no neck masses noted. Respiratory: lungs clear to auscultation bilaterally. Cardiovascular: regular rate and rhythm, no murmurs, rubs or gallops. Abdomen: normal bowel sounds, soft, non-tender to palpation. Extremities: no lower extremity edema, no cyanosis or clubbing. Neuro: alert and oriented x 3; non-focal exam.  Skin: no rashes. Psych: normal mood and affect.            Lopez Sneed, MD  [facility]  [email or web address]  Phone: [phone]  Pager: [phone]  [email or web address]

## 2019-08-22 ENCOUNTER — HOSPITAL ENCOUNTER (OUTPATIENT)
Dept: NUTRITION | Age: 24
Discharge: HOME | End: 2019-08-22
Payer: COMMERCIAL

## 2019-08-22 PROCEDURE — 97803 MED NUTRITION INDIV SUBSEQ: CPT

## 2019-08-22 NOTE — PROGRESS NOTES
NUTRITION - FOLLOW-UP TREATMENT NOTE  Patient Name: Magda William         Date: 2019  : 1995    YES Patient  Verified  Diagnosis: Abnormal Weight Gain   In time:   0900             Out time:   930   Total Treatment Time (min):   30     SUBJECTIVE/ASSESSMENT    Changes in medication or medical history? Any new allergies, surgeries or procedures? NO    If yes, update Summary List   No Change          Current Wt: 252 Previous Wt: 251 Wt Change: maintain     Achievement of Goals: Pt has been doing so good since last visit. She has been exercising 4 days per week on the treadmill as well as strength. She is down 2 pants sizes. She is happier and less depressed. She has a new job and will be working full-time and part-time at [employer]. She plans to start meal prep again on the weekends. She is interested in [id number], reviewed the basics with her. She remains motivated and focused. Will contineu to follow. Patient Education:  [x]  Review current plan with patient   []  Other:    Handouts/  Information Provided: []  Carbohydrates  []  Protein  []  Fiber  []  Serving Sizes  []  Fluids  []  General guidelines []  Diabetes  []  Cholesterol  []  Sodium  []  SBGM  []  Food Journals  [x]  Others: Med diet     New Patient Goals: - Make sure you have a game plan for meals w/ new job; especially on Tuesday and Thursday. - Meal prep on the weekends; exercise on \"non-[employer]\" days  - Review the parameters of the Mediterranean diet; used allrecipes/mealime for ideas. PLAN    [x]  Continue on current plan []  Follow-up PRN   []  Discharge due to :    [x]  Next appt:  Oct 1 @ 1918     Dietitian: Tianna Mayers MA, RD    Date: 2019 Time: 8:48 AM

## 2019-09-06 NOTE — PROGRESS NOTES
[facility]  [address]  [place], [address]  [phone] fax    Physical Therapy Discharge Summary  Patient name: Mara Aguirre Start of Care: 3/14/2019   Referral source: Alise Cheadle, PA : 1995                Medical Diagnosis: Right hip pain [M25.551]  Payor: BLUE CROSS / Plan: [place]ERAL / Product Type: PPO /  Onset FLHK:                Treatment Diagnosis: Right lateral hip pain   Prior Hospitalization: see medical history Provider#: [id number]   Medications: Verified on Patient summary List    Comorbidities: obesity, asthma, depression, fibromyalgia, knee pain   Prior Level of Function: functionally I with all activities    Visits from Start of Care: 5    Missed Visits: 3  Reporting Period : 3-14-19 to 3-29-19      Summary of Care: pt self discharged. She was progressing well with PT. Unable to further reassess due to unexpected discharge. Progress towards goals / Updated goals:  Short Term Goals: To be accomplished in 1 weeks:               1. Pt will be I and compliant with HEP Met - pt reports compliance 3/18/2019  Long Term Goals: To be accomplished in 4 weeks:               1. Improve FOTO to 67 to improve ability for functional tasks.               2. Pt will reports no difficulty with getting in and out of a car. - pt reports no difficulty 3-29-19               3. Pt will report no difficulty with walking 2 blocks.                4. Pt will demonstrate at least 90 degrees of right hip AROM to improve ability for functional Met >100 deg hip flexion AROM without pain 3/20/19      ASSESSMENT/RECOMMENDATIONS:  [x]Discontinue therapy: []Patient has reached or is progressing toward set goals      [x]Patient is non-compliant or has abdicated      []Due to lack of appreciable progress towards set [address], PT 2019 3:10 PM

## 2019-10-01 ENCOUNTER — HOSPITAL ENCOUNTER (OUTPATIENT)
Dept: NUTRITION | Age: 24
Discharge: HOME | End: 2019-10-01
Payer: COMMERCIAL

## 2019-10-01 PROCEDURE — 97803 MED NUTRITION INDIV SUBSEQ: CPT

## 2019-10-01 NOTE — PROGRESS NOTES
NUTRITION - FOLLOW-UP TREATMENT NOTE  Patient Name: Janie Daniel         Date: 10/1/2019  : 1995    YES Patient  Verified  Diagnosis: abnormal weight gain   In time:   830            Out time:0900   Total Treatment Time (min):   30     SUBJECTIVE/ASSESSMENT    Changes in medication or medical history? Any new allergies, surgeries or procedures? YES    If yes, update Summary List   Currently on anti-biotic for sinus infection         Current Wt: 252 Previous Wt: 252 Wt Change: 0     Achievement of Goals: Pt has had no change in dietary pattern since last visit. She has had back-to-back illness stomach bug and sinus infection and is now on anti-biotics. She tried the [address] diet, but the high fat made her sick so she just went back to low carb with high protein and vegetables. Discussed starting a probiotic with all the GI issues she has going on. Pt plans to start meal prep again and discussed eating small meals every 2-3 hours until her appetite comes back. She did quit her full-time job (stated it was a nightmare) and is not in school currently (class she was taking was cancelled) but still working at [employer]. Will continue to follow         Patient Education:  [x]  Review current plan with patient   []  Other:    Handouts/  Information Provided: []  Carbohydrates  []  Protein  []  Fiber  []  Serving Sizes  []  Fluids  []  General guidelines []  Diabetes  []  Cholesterol  []  Sodium  []  SBGM  []  Food Journals  []  Others:      New Patient Goals: -  Start a probiotic ( culturelle) take one/day especially with anti-biotic use  - Stay hydrated with water, Pedialyte, G2 Gatorade, or coconut water  - once feeling better get back into an exercise routine.       PLAN    [x]  Continue on current plan []  Follow-up PRN   []  Discharge due to :    [x]  Next appt:  @ 0900     Dietitian: Lindsey Cardoza MA, RD    Date: 10/1/2019 Time: 8:25 AM

## 2019-10-09 ENCOUNTER — HOSPITAL ENCOUNTER (OUTPATIENT)
Dept: LAB | Age: 24
Discharge: HOME | End: 2019-10-09
Payer: COMMERCIAL

## 2019-10-09 LAB
BASOPHILS # BLD: 0 K/UL (ref 0–0.1)
BASOPHILS NFR BLD: 0 % (ref 0–2)
DIFFERENTIAL METHOD BLD: NORMAL
EOSINOPHIL # BLD: 0.1 K/UL (ref 0–0.4)
EOSINOPHIL NFR BLD: 1 % (ref 0–5)
ERYTHROCYTE [DISTWIDTH] IN BLOOD BY AUTOMATED COUNT: 14.2 % (ref 11.6–14.5)
HCT VFR BLD AUTO: 39 % (ref 35–45)
HGB BLD-MCNC: 13 G/DL (ref 12–16)
LYMPHOCYTES # BLD: 2.2 K/UL (ref 0.9–3.6)
LYMPHOCYTES NFR BLD: 23 % (ref 21–52)
MCH RBC QN AUTO: 28.3 PG (ref 24–34)
MCHC RBC AUTO-ENTMCNC: 33.3 G/DL (ref 31–37)
MCV RBC AUTO: 84.8 FL (ref 74–97)
MONOCYTES # BLD: 0.5 K/UL (ref 0.05–1.2)
MONOCYTES NFR BLD: 5 % (ref 3–10)
NEUTS SEG # BLD: 6.6 K/UL (ref 1.8–8)
NEUTS SEG NFR BLD: 71 % (ref 40–73)
PLATELET # BLD AUTO: 240 K/UL (ref 135–420)
PMV BLD AUTO: 11.5 FL (ref 9.2–11.8)
RBC # BLD AUTO: 4.6 M/UL (ref 4.2–5.3)
WBC # BLD AUTO: 9.4 K/UL (ref 4.6–13.2)

## 2019-10-09 PROCEDURE — 36415 COLL VENOUS BLD VENIPUNCTURE: CPT

## 2019-10-09 PROCEDURE — 82784 ASSAY IGA/IGD/IGG/IGM EACH: CPT

## 2019-10-09 PROCEDURE — 85025 COMPLETE CBC W/AUTO DIFF WBC: CPT

## 2019-10-10 ENCOUNTER — HOSPITAL ENCOUNTER (OUTPATIENT)
Dept: GENERAL RADIOLOGY | Age: 24
Discharge: HOME | End: 2019-10-10
Payer: COMMERCIAL

## 2019-10-10 DIAGNOSIS — R05.9 COUGH: ICD-10-CM

## 2019-10-10 PROCEDURE — 71046 X-RAY EXAM CHEST 2 VIEWS: CPT

## 2019-10-11 LAB
IGA SERPL-MCNC: 107 MG/DL (ref 87–352)
IGE SERPL-ACNC: 21 IU/ML (ref 6–495)
IGG SERPL-MCNC: 600 MG/DL (ref 700–1600)
IGM SERPL-MCNC: 168 MG/DL (ref 26–217)

## 2019-11-08 ENCOUNTER — HOSPITAL ENCOUNTER (OUTPATIENT)
Dept: GENERAL RADIOLOGY | Age: 24
Discharge: HOME | End: 2019-11-08
Payer: COMMERCIAL

## 2019-11-08 DIAGNOSIS — J34.89 OVERDEVELOPMENT OF NASAL BONES: ICD-10-CM

## 2019-11-08 DIAGNOSIS — J45.40 MODERATE PERSISTENT ASTHMA: ICD-10-CM

## 2019-11-08 PROCEDURE — 70220 X-RAY EXAM OF SINUSES: CPT

## 2019-11-14 ENCOUNTER — HOSPITAL ENCOUNTER (OUTPATIENT)
Dept: NUTRITION | Age: 24
Discharge: HOME | End: 2019-11-14
Payer: COMMERCIAL

## 2019-11-14 PROCEDURE — 97803 MED NUTRITION INDIV SUBSEQ: CPT

## 2019-11-14 NOTE — PROGRESS NOTES
NUTRITION - FOLLOW-UP TREATMENT NOTE  Patient Name: Isabel Hernandez         Date: 2019  : 1995    YES Patient  Verified  Diagnosis: abnormal weight gain   In time:   0900            Out time:0930   Total Treatment Time (min):   30     SUBJECTIVE/ASSESSMENT    Changes in medication or medical history? Any new allergies, surgeries or procedures? YES    If yes, update Summary List   Was on Steroid for sinus infection (2 weeks off), Gabapenting is being weaned to D/C. Abilify remains 5 mg. Current Wt: 253 Previous Wt: 252 Wt Change: 1     Achievement of Goals: Pt has been following recommendations, She is using food lists to create shopping lists and buys food according to that. Her portions are similar to the standard plate portions, and she admits she doesn't really eat carbs anymore. She is drinking water and juice only if it feels like her blood sugar is dropping. She is 2 weeks out from having to take steroid for sinuses and is being weaned off Gabapentin. She is having some anxiety , but does housework or other things to distract herself. She is losing inches, just not weight; anticipate this is from medication shifted, changes, as well as Abilify making it difficult to lose.  will continue to follow         Patient Education:  [x]  Review current plan with patient   []  Other:    Handouts/  Information Provided: []  Carbohydrates  []  Protein  []  Fiber  []  Serving Sizes  []  Fluids  []  General guidelines []  Diabetes  []  Cholesterol  []  Sodium  []  SBGM  []  Food Journals  []  Others:      New Patient Goals: -  Get started on an exercise routine ([employer]) 3 times per week; (January changing to [facility] and getting a )  - Start writing down what you eat with est portion sizes and bring to next appointment  - Start doing your measurements (monly; Neck, Natural waits, Hips     PLAN    [x]  Continue on current plan []  Follow-up PRN   []  Discharge due to :    [x]  Next appt: 12 Dec @ 0900     Dietitian: Anamika Sierra MA, RD    Date: 11/14/2019 Time: 8:25 AM

## 2019-12-12 ENCOUNTER — HOSPITAL ENCOUNTER (OUTPATIENT)
Dept: NUTRITION | Age: 24
Discharge: HOME | End: 2019-12-12
Payer: COMMERCIAL

## 2019-12-12 PROCEDURE — 97803 MED NUTRITION INDIV SUBSEQ: CPT

## 2019-12-12 NOTE — PROGRESS NOTES
NUTRITION - FOLLOW-UP TREATMENT NOTE  Patient Name: Janet Barrios         Date: 2019  : 1995    YES Patient  Verified  Diagnosis: abnormal weight gain   In time:   0900            Out time:0930   Total Treatment Time (min):   30     SUBJECTIVE/ASSESSMENT    Changes in medication or medical history? Any new allergies, surgeries or procedures? YES    If yes, update Summary List            Current Wt: 255 Previous Wt: 253 Wt Change: +2     Achievement of Goals: Pt has been following recommendations when she is able. She has been sick since before thanks and not able to tolerate much food. She has been relying on popsicles and toast mostly, but recently has been able to incorporate salads, some proteins, and other carbs. Pt states she is going down in pats size thought her weight is not changing and/or increasing slightly. She still needs to get a measuring tape as hers broke. She had to give up her gym membership as she has other financial stressors to deal with currently. She has been babysitting lately and starts school again in January. Reviewed goals from last appointment and reinforced making changes and writing down her food intake. Also discussed pts need to get more strict with her diet and she agreed. Hoping she will not be sick at her next appointment.  will continue to follow         Patient Education:  [x]  Review current plan with patient   []  Other:    Handouts/  Information Provided: []  Carbohydrates  []  Protein  []  Fiber  []  Serving Sizes  []  Fluids  []  General guidelines []  Diabetes  []  Cholesterol  []  Sodium  []  SBGM  []  Food Journals  []  Others:      New Patient Goals: -  Get started on an exercise routine (walking/at home exercises) 3 times per week;   - Continue writing down what you eat with est portion sizes and bring to next appointment   - Keep beverages to <5 calories and <3 gm sugar or plain water  - Start doing your measurements (monly; Neck, Natural waits, Hips     PLAN    [x]  Continue on current plan []  Follow-up PRN   []  Discharge due to :    [x]  Next appt: 12 Dec @ 0900     Dietitian: Shanika Ortiz MA, RD    Date: 16 Jan @ 0900 Time: 8:25 AM

## 2020-01-16 ENCOUNTER — HOSPITAL ENCOUNTER (OUTPATIENT)
Dept: NUTRITION | Age: 25
Discharge: HOME | End: 2020-01-16
Payer: COMMERCIAL

## 2020-01-16 PROCEDURE — 97803 MED NUTRITION INDIV SUBSEQ: CPT

## 2020-01-16 NOTE — PROGRESS NOTES
NUTRITION - FOLLOW-UP TREATMENT NOTE  Patient Name: Tesha Beauchamp         Date: 2020  : 1995    YES Patient  Verified  Diagnosis: abnormal weight gain   In time:   0900            Out time:0930   Total Treatment Time (min):   30     SUBJECTIVE/ASSESSMENT    Changes in medication or medical history? Any new allergies, surgeries or procedures? YES    If yes, update Summary List     Botox injection in bladder       Current Wt: N/A Previous Wt: 255 Wt Change:      Achievement of Goals: Pt is here today with her mom. She is very frustrated with her lack of weight loss. Both mom and patient confirm she is eating well balanced and proper portions, but she is retaining fluid, having cravings for nutrient specific foods and over consuming these. After discussing asked pt if she is purposely restricting calories and foods to help lose weight (red flag when discussing calories goal and her having to meet it). Pt admitted she is purposely restricting and she knows she has an Eating Disorder, pt because very upset with this admission but also . Mom admits she noticed pt is not consuming much food at meals. Pt does have a therapist, but has not brought this up to her yet. Discussed the importance of her talking about it and being able to recover from it. Pt was emotional about it, but did state she wanted to get better and be healthy. Will be shifting focus of appointments away from weight loss and more on recovery and healthy eating patterns. With food not being the enemy and getting to the root with her therapist. Ag Jamil is sympathetic and happy to help and be involved, she will be attending her follow-up appointment with me.           Patient Education:  [x]  Review current plan with patient   []  Other:    Handouts/  Information Provided: []  Carbohydrates  []  Protein  []  Fiber  []  Serving Sizes  []  Fluids  []  General guidelines []  Diabetes  []  Cholesterol  []  Sodium  []  SBGM  []  Food Journals  [] Others:      New Patient Goals: -Start a probiotic  -begin talking with Jena Bond about ED  - Start using recovery record - aim for eating 3 meals with 2 snacks daily.      PLAN    [x]  Continue on current plan []  Follow-up PRN   []  Discharge due to :    [x]  Next appt: 5 Feb @ 4048     Dietitian: Nilesh Monroy MA, RD    Date: 16 Jan @ 0900 Time: 8:25 AM

## 2020-02-04 ENCOUNTER — HOSPITAL ENCOUNTER (OUTPATIENT)
Dept: GENERAL RADIOLOGY | Age: 25
Discharge: HOME | End: 2020-02-04
Payer: COMMERCIAL

## 2020-02-04 DIAGNOSIS — M25.551 RIGHT HIP PAIN: ICD-10-CM

## 2020-02-04 DIAGNOSIS — M54.50 LOW BACK PAIN: ICD-10-CM

## 2020-02-04 PROCEDURE — 73521 X-RAY EXAM HIPS BI 2 VIEWS: CPT

## 2020-02-04 PROCEDURE — 72110 X-RAY EXAM L-2 SPINE 4/>VWS: CPT

## 2020-02-05 ENCOUNTER — HOSPITAL ENCOUNTER (OUTPATIENT)
Dept: NUTRITION | Age: 25
Discharge: HOME | End: 2020-02-05
Payer: COMMERCIAL

## 2020-02-05 PROCEDURE — 97803 MED NUTRITION INDIV SUBSEQ: CPT

## 2020-02-05 NOTE — PROGRESS NOTES
NUTRITION - FOLLOW-UP TREATMENT NOTE  Patient Name: Lynn Estevez         Date: 2020  : 1995    YES Patient  Verified  Diagnosis: abnormal weight gain   In time:   0900            Out time:0930   Total Treatment Time (min):   30     SUBJECTIVE/ASSESSMENT    Changes in medication or medical history? Any new allergies, surgeries or procedures? YES    If yes, update Summary List     Botox injection in bladder       Current Wt: N/A Previous Wt: 255 Wt Change:      Achievement of Goals: Pt is here today with her mom. She has been doing better since last appointment and has discussed ED with therapist Nicole Toscano. Both mom and patient confirm she is eating better balanced and proper portions, but she sometimes still restricting calories and foods to help lose weight (red flag when discussing calories goal and her having to meet it). Pt discussed difficulties with preparing foods and trying to eat them. Came up with mechanisms to counter this. Her therapist has also given her exercises to practice to combat her Eating Disorder when it is time to eat. Pt and mom do have conflict with how they communicate with each other and need to work oon that as with every ED there is a root to it. Patient admits she has been restrictive since she was 17. Pt is still motivated to   get better and be healthy. Will be shifting focus of appointments away from weight loss and more on recovery and healthy eating patterns. With food not being the enemy and getting to the root with her therapist. Jacqueline Amalia is sympathetic and happy to help and be involved, she will be attending her follow-up appointment with me. Pt is also going to journal and has started school which adds a bit more stress.           Patient Education:  [x]  Review current plan with patient   []  Other:    Handouts/  Information Provided: []  Carbohydrates  []  Protein  []  Fiber  []  Serving Sizes  []  Fluids  []  General guidelines []  Diabetes  [] Cholesterol  []  Sodium  []  SBGM  []  Food Journals  []  Others:      New Patient Goals: -Start a probiotic -MET  -begin talking with Jack Cordon about ED - MET  - Make sure you are eating every 3-4 hours as large volume of food is uncomfortable, but need nutrients  - Journal feelings when triggered; foods, events, words, etc  - exercise: mom is looking into getting a  at the [facility]    [x]  Continue on current plan []  Follow-up PRN   []  Discharge due to :    [x]  Next appt: 5 March @ 0830     Dietitian: Heri Gomez MA, RD    Date: 16 Jan @ 0900 Time: 8:25 AM

## 2020-02-17 ENCOUNTER — HOSPITAL ENCOUNTER (OUTPATIENT)
Dept: PHYSICAL THERAPY | Age: 25
Discharge: HOME | End: 2020-02-17
Payer: COMMERCIAL

## 2020-02-17 PROCEDURE — 97112 NEUROMUSCULAR REEDUCATION: CPT

## 2020-02-17 PROCEDURE — 97110 THERAPEUTIC EXERCISES: CPT

## 2020-02-17 PROCEDURE — 97162 PT EVAL MOD COMPLEX 30 MIN: CPT
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
## 2020-02-24 ENCOUNTER — HOSPITAL ENCOUNTER (OUTPATIENT)
Dept: PHYSICAL THERAPY | Age: 25
Discharge: HOME | End: 2020-02-24
Payer: COMMERCIAL

## 2020-02-24 PROCEDURE — 97112 NEUROMUSCULAR REEDUCATION: CPT

## 2020-02-24 PROCEDURE — 97530 THERAPEUTIC ACTIVITIES: CPT

## 2020-02-24 PROCEDURE — 97110 THERAPEUTIC EXERCISES: CPT

## 2020-02-24 NOTE — PROGRESS NOTES
PT DAILY TREATMENT NOTE 10-18    Patient Name: Leda Vargas  Date:2020  : 1995  [x]  Patient  Verified  Payor: BLUE CROSS / Plan: [employer] [facility] / Product Type: PPO /    In time:1015  Out time:1057  Total Treatment Time (min): 42  Visit #: 2 of 12    Medicare/BCBS Only   Total Timed Codes (min):  42 1:1 Treatment Time:  42       Treatment Area: Low back pain [M54.5]    SUBJECTIVE  Pain Level (0-10 scale): 3-4  Any medication changes, allergies to medications, adverse drug reactions, diagnosis change, or new procedure performed?: [x] No    [] Yes (see summary sheet for update)  Subjective functional status/changes:   [] No changes reported  Pt reports she has been doing HEP and has found the exercises helpful. OBJECTIVE    15 min Therapeutic Exercise:  [x] See flow sheet :   Rationale: increase ROM and increase strength to improve the patients ability to complete functional activities    12 min Therapeutic Activity:  [x]  See flow sheet :   Rationale: increase ROM, increase strength, improve coordination and increase proprioception  to improve the patients ability to complete job related task. 15 min Neuromuscular Re-education:  [x]  See flow sheet :   Rationale:  to improve the patients ability to activate core effectively and maintain proper posture to decrease pain and improve body mechanics with functional activity. With   [x] TE   [x] TA   [x] neuro   [] other: Patient Education: [x] Review HEP    [] Progressed/Changed HEP based on:   [] positioning   [] body mechanics   [] transfers   [] heat/ice application    [] other:      Other Objective/Functional Measures:   Access Code: [email or web address]   URL: [email or web address]   Date: 2020   Prepared by: Josette Hugo     Exercises   Seated Slump Nerve Glide - 10 reps - 1 sets - 5-10 hold - 2x daily - 7x weekly      Pain Level (0-10 scale) post treatment: \"same\"    ASSESSMENT/Changes in Function: Session focused on increasing strength and core activation to increase patient's ability to complete job related task with decreased pain. Added sciatic nerve glides with good tolerance and hard written copy printed for pt to complete daily; pt voiced understanding. Verbal cues and demonstration required to use correct body mechanics. Encouraged pt to complete HEP daily to help to continue to progress PT interventions to improve patient's ability to complete functional and community task independently. At the conclusion of the session, pt reported no change in pain levels. Pt denied modalities. Patient will continue to benefit from skilled PT services to modify and progress therapeutic interventions, address functional mobility deficits, address ROM deficits, address strength deficits, analyze and address soft tissue restrictions, analyze and cue movement patterns, analyze and modify body mechanics/ergonomics, assess and modify postural abnormalities and address imbalance/dizziness to attain remaining goals. [x]  See Plan of Care  []  See progress note/recertification  []  See Discharge Summary         Progress towards goals / Updated goals:  Short term goals to be completed in 2 weeks  1. Pt will be compliant and I with HEP to help reduce pain and improve abilities to complete ADLs.             EVAL: written hardcopy given and initiated exercises              CURRENT: completing 2/24  2. Pt will report no higher than 4/10 pain to improve patient's ability to complete household chores.              EVAL: worst 8              CURRENT: 3-4 2/24     Long term goals to be completed in 12 total treatments  1. Pt will demonstrate 73 FOTO score to improve abilities to complete school related task              EVAL: 60              CURRENT:  2. Pt will report no higher than 1/10 pain to improve patient's ability to complete self care              EVAL: worst 8              CURRENT:  3.  Pt will demonstrate 5/5 strength in B LE to improve patient's ability to stairs safely up to bedroom with decreased pain              EVAL: Hip F B 4+, ext: B 4, abd/add B 4-; knee F B 4, ext R 4+              CURRENT:  4. Pt will report at least 50% improvement due to skilled PT interventions to increase abilities to complete functional task.              CURRENT:  5. Pt will report being able to sit for at least 20-25 min with no increase in symptoms to increase abilities to drive to doctor appointments.  Elwyn Bering: 10-15 min              CURRENT: same 2/24  6.  Pt will report being able to stand for at least 20-25 min with no increase in symptoms to complete household chores.  Elwyn Bering: 10-15 min              CURRENT: same 2/24    PLAN  [x]  Upgrade activities as tolerated     [x]  Continue plan of care  [x]  Update interventions per flow sheet       []  Discharge due to:_  []  Other:_      Sarah De Jesus 2/24/2020  10:22 AM    Future Appointments   Date Time Provider Adelso Louise   2/24/2020 10:30 AM Norm Christine [facility] [address]   2/26/2020 10:30 AM Rosemarie Nunez PTA [facility] [address]   3/3/2020 11:00 AM Norm Zack [facility] [address]   3/5/2020  8:30 AM KYLE Silva [address]   3/16/2020 10:30 AM Roel Roth [address]   3/18/2020 11:00 AM Norm Zack [facility] [address]   6/15/2020  1:30 PM Araceli Joseph MD [facility]

## 2020-02-26 ENCOUNTER — APPOINTMENT (OUTPATIENT)
Dept: PHYSICAL THERAPY | Age: 25
End: 2020-02-26
Payer: COMMERCIAL

## 2020-03-03 ENCOUNTER — HOSPITAL ENCOUNTER (OUTPATIENT)
Dept: PHYSICAL THERAPY | Age: 25
Discharge: HOME | End: 2020-03-03
Payer: COMMERCIAL

## 2020-03-03 PROCEDURE — 97110 THERAPEUTIC EXERCISES: CPT

## 2020-03-03 PROCEDURE — 97530 THERAPEUTIC ACTIVITIES: CPT

## 2020-03-03 NOTE — PROGRESS NOTES
PT DAILY TREATMENT NOTE 10-18    Patient Name: Konstantin Osborne  Date:3/3/2020  : 1995  [x]  Patient  Verified  Payor: BLUE CROSS / Plan: [facility] / Product Type: PPO /    In time:1100  Out time:1130  Total Treatment Time (min): 30  Visit #: 3 of 12    Medicare/BCBS Only   Total Timed Codes (min):  30 1:1 Treatment Time:  27       Treatment Area: Low back pain [M54.5]    SUBJECTIVE  Pain Level (0-10 scale): 0  Any medication changes, allergies to medications, adverse drug reactions, diagnosis change, or new procedure performed?: [x] No    [] Yes (see summary sheet for update)  Subjective functional status/changes:   [] No changes reported  Pt reports she is feeling much better and has been doing HEP. OBJECTIVE    15 min Therapeutic Exercise:  [x] See flow sheet :   Rationale: increase ROM and increase strength to improve the patients ability to complete functional activities. 15 min Therapeutic Activity:  [x]  See flow sheet :   Rationale: increase ROM, increase strength, improve coordination and increase proprioception  to improve the patients ability to complete job related task. With   [x] TE   [x] TA   [] neuro   [] other: Patient Education: [x] Review HEP    [] Progressed/Changed HEP based on:   [] positioning   [] body mechanics   [] transfers   [] heat/ice application    [] other:      Other Objective/Functional Measures:      Pain Level (0-10 scale) post treatment: \"fatigued\"    ASSESSMENT/Changes in Function: Progressed resistance and reps to increase strength and endurance to complete functional activities with good tolerance. Pt was getting fatigued after 30 min so session was ended. Pt denied modalities. Verbal cues and demonstration required to use correct body mechanics. Encouraged pt to complete HEP daily to help to continue to progress PT interventions to improve patient's ability to complete functional and community task independently.   At the conclusion of the session, pt reported fatigue from activity. Patient will continue to benefit from skilled PT services to modify and progress therapeutic interventions, address functional mobility deficits, address ROM deficits, address strength deficits, analyze and address soft tissue restrictions, analyze and cue movement patterns, analyze and modify body mechanics/ergonomics and assess and modify postural abnormalities to attain remaining goals. [x]  See Plan of Care  []  See progress note/recertification  []  See Discharge Summary         Progress towards goals / Updated goals:  Short term goals to be completed in 2 weeks  1. Pt will be compliant and I with HEP to help reduce pain and improve abilities to complete ADLs.             EVAL: written hardcopy given and initiated exercises              CURRENT: completing 2/24 3/3  2. Pt will report no higher than 4/10 pain to improve patient's ability to complete household chores.              EVAL: worst 8              CURRENT: 3-4 2/24 0 3/3     Long term goals to be completed in 12 total treatments  1. Pt will demonstrate 73 FOTO score to improve abilities to complete school related task              EVAL: 60              CURRENT:  2. Pt will report no higher than 1/10 pain to improve patient's ability to complete self care              EVAL: worst 8              CURRENT: 0 3/3  3. Pt will demonstrate 5/5 strength in B LE to improve patient's ability to stairs safely up to bedroom with decreased pain              EVAL: Hip F B 4+, ext: B 4, abd/add B 4-; knee F B 4, ext R 4+              CURRENT:  4. Pt will report at least 50% improvement due to skilled PT interventions to increase abilities to complete functional task.              CURRENT:  5. Pt will report being able to sit for at least 20-25 min with no increase in symptoms to increase abilities to drive to doctor appointments.  Corene Yazdanism: 10-15 min              CURRENT: same 2/24  6.  Pt will report being able to stand for at least 20-25 min with no increase in symptoms to complete household chores.  Corene Spearing: 10-15 min              CURRENT: same 2/24    PLAN  [x]  Upgrade activities as tolerated     [x]  Continue plan of care  [x]  Update interventions per flow sheet       []  Discharge due to:_  []  Other:_      In-person therapy visits for this out-patient have been placed on temporary hold until on or after 5/26/20 in compliance with organizational directives and CDC recommendations related to the current [address] [place] [address] [address] pandemic. Contact with this patient by the treating therapist may be made via telephonic e-visits and/or telehealth visits during this time, if applicable.      Estefania Fuentes 3/3/2020  11:06 AM    Future Appointments   Date Time Provider Adelso Louise   3/5/2020  8:30 AM Kathia Richardson RD [facility]   3/16/2020 10:30 AM Michael [facility]   3/18/2020 11:00 AM Michael [facility]   6/15/2020  1:30 PM Dina Jay MD [facility]

## 2020-03-05 ENCOUNTER — HOSPITAL ENCOUNTER (OUTPATIENT)
Dept: NUTRITION | Age: 25
Discharge: HOME | End: 2020-03-05
Payer: COMMERCIAL

## 2020-03-05 PROCEDURE — 97803 MED NUTRITION INDIV SUBSEQ: CPT

## 2020-03-05 NOTE — PROGRESS NOTES
NUTRITION - FOLLOW-UP TREATMENT NOTE  Patient Name: Harper Barker         Date: 3/5/2020  : 1995    YES Patient  Verified  Diagnosis: abnormal weight gain   In time:   0830            Out time:0900   Total Treatment Time (min):   30     SUBJECTIVE/ASSESSMENT    Changes in medication or medical history? Any new allergies, surgeries or procedures? YES    If yes, update Summary List     Botox injection in bladder       Current Wt: N/A Previous Wt: 255 Wt Change:      Achievement of Goals: Pt is here today with her mom. She has been doing better since last appointment and has discussed ED with therapist Alondra Ardon. Both mom and patient confirm she is eating better balanced and proper portions, she is not restricting as much. Pt is still motivated to get better and be healthy; she has started to see a  at the gym and will be going 2 times per week. Pt has seen her therapist and they have been working on different coping mechanisms for patient to try. Mom and patient asked why would pt lose inches and not weight on the scale, explained the reasoning behind this as well as not looking at the scale (was weighed at her doctors appointment)  Discussed the importance of identifying her triggers and knowing what causes or may cause a rebound with her ED. She is  perfectionist as well as has OCD tendencies which are all behaviors that typically come with ED. . Mom is sympathetic and happy to help and be involved, she will be attending her follow-up appointment with me. Pt is also going to journal and has started school which adds more stress. Family is going out of town for a few days and pt is looking forward to getting away.  Will follow up in one month         Patient Education:  [x]  Review current plan with patient   []  Other:    Handouts/  Information Provided: []  Carbohydrates  []  Protein  []  Fiber  []  Serving Sizes  []  Fluids  []  General guidelines []  Diabetes  []  Cholesterol  [] Sodium  []  SBGM  []  Food Journals  []  Others:      New Patient Goals: -Continue to eat healthier, make good choices when traveling, and bring snacks and food  - Begin to journal and create a list of potential triggers that therapist can help with.       PLAN    [x]  Continue on current plan []  Follow-up PRN   []  Discharge due to :    [x]  Next appt: 2 APril @ 0900     Dietitian: Jared Matias MA, RD    Date: 16 Jan @ 0900 Time: 8:25 AM

## 2020-03-16 ENCOUNTER — APPOINTMENT (OUTPATIENT)
Dept: PHYSICAL THERAPY | Age: 25
End: 2020-03-16
Payer: COMMERCIAL

## 2020-03-18 ENCOUNTER — APPOINTMENT (OUTPATIENT)
Dept: PHYSICAL THERAPY | Age: 25
End: 2020-03-18
Payer: COMMERCIAL

## 2020-04-02 ENCOUNTER — APPOINTMENT (OUTPATIENT)
Dept: NUTRITION | Age: 25
End: 2020-04-02
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
## (undated) DEVICE — MEDI-VAC NON-CONDUCTIVE SUCTION TUBING: Brand: CARDINAL HEALTH

## (undated) DEVICE — FLEX ADVANTAGE 1500CC: Brand: FLEX ADVANTAGE

## (undated) DEVICE — BITE BLOCK ENDOSCP UNIV AD 6 TO 9.4 MM

## (undated) DEVICE — FORCEPS BX L240CM JAW DIA2.8MM L CAP W/ NDL MIC MESH TOOTH

## (undated) DEVICE — AIRLIFE™ NASAL OXYGEN CANNULA CURVED, NONFLARED TIP WITH 14 FOOT (4.3 M) CRUSH-RESISTANT TUBING, OVER-THE-EAR STYLE: Brand: AIRLIFE™

## (undated) DEVICE — Device

## (undated) DEVICE — CATH IV SAFE STR 22GX1IN BLU -- PROTECTIV PLUS